# Patient Record
Sex: FEMALE | Race: WHITE
[De-identification: names, ages, dates, MRNs, and addresses within clinical notes are randomized per-mention and may not be internally consistent; named-entity substitution may affect disease eponyms.]

---

## 2018-12-09 ENCOUNTER — HOSPITAL ENCOUNTER (EMERGENCY)
Dept: HOSPITAL 76 - ED | Age: 28
Discharge: HOME | End: 2018-12-09
Payer: MEDICAID

## 2018-12-09 VITALS — SYSTOLIC BLOOD PRESSURE: 101 MMHG | DIASTOLIC BLOOD PRESSURE: 59 MMHG

## 2018-12-09 DIAGNOSIS — J98.01: ICD-10-CM

## 2018-12-09 DIAGNOSIS — F17.200: ICD-10-CM

## 2018-12-09 DIAGNOSIS — J40: Primary | ICD-10-CM

## 2018-12-09 PROCEDURE — 99283 EMERGENCY DEPT VISIT LOW MDM: CPT

## 2018-12-09 PROCEDURE — 93005 ELECTROCARDIOGRAM TRACING: CPT

## 2018-12-09 PROCEDURE — 71046 X-RAY EXAM CHEST 2 VIEWS: CPT

## 2018-12-09 PROCEDURE — 94640 AIRWAY INHALATION TREATMENT: CPT

## 2018-12-09 NOTE — ED PHYSICIAN DOCUMENTATION
PD HPI DYSPNEA





- Stated complaint


Stated Complaint: SHORTNESS OF BREATH





- Chief complaint


Chief Complaint: Resp





- History obtained from


History obtained from: Patient





- History of Present Illness


Timing - onset: How many weeks ago (couple of weeks, per patient)


Timing - duration: Weeks


Timing - details: Gradual onset


Pain level now: 5 (right ribs)


Improved by: Rest


Worsened by: Coughing


Associated symptoms: Cough, Chest pain / discomfort.  No: Fever, Bilateral 

edema, Unilateral edema


Recently seen: Emergency Dept ( ED)





- Additional information


Additional information: 





c/o cough, dyspnea x few weeks with right-sided chest pain. evaluated at  ED 

few days ago for same





Review of Systems


Constitutional: denies: Fever, Chills, Sweats


Cardiac: reports: Chest pain / pressure.  denies: Palpitations, Pedal edema


Respiratory: reports: Dyspnea, Cough, Wheezing.  denies: Hemoptysis





PD PAST MEDICAL HISTORY





- Past Medical History


Past Medical History: Yes


Cardiovascular: None


Respiratory: None


Endocrine/Autoimmune: None


GI: None


GYN: Ovarian cysts, Ovarian cancer


: None


HEENT: Chronic hearing loss


Psych: Depression, Anxiety


Musculoskeletal: Other


Derm: None





- Past Surgical History


Past Surgical History: Yes


General: Appendectomy


/GYN:  section, Oophrectomy





- Present Medications


Home Medications: 


                                Ambulatory Orders











 Medication  Instructions  Recorded  Confirmed


 


Citalopram Hydrobromide  18





[Citalopram HBr]   


 


Cyclobenzaprine [Flexeril] 10 mg PO TID PRN #20 tablet 18 


 


Loperamide [Imodium] 2 mg PO QID PRN #10 capsule 08/10/18 


 


Ondansetron HCl [Zofran] 4 mg PO Q6H PRN #10 tablet 08/10/18 


 


Promethazine [Phenergan] 25 - 50 mg PO Q6H PRN #15 tab 08/10/18 


 


Hydrocodone/Acetaminophen 1 - 2 each PO Q6H PRN #10 tablet 18 





[Hydrocodon-Acetaminophen 5-325]   


 


predniSONE [Prednisone] 40 mg PO DAILY 4 Days #8 tablet 18 














- Allergies


Allergies/Adverse Reactions: 


                                    Allergies











Allergy/AdvReac Type Severity Reaction Status Date / Time


 


adhesive Allergy  Rash Verified 18 01:14


 


Penicillins Allergy  Hives Verified 18 01:14


 


vancomycin Allergy  Rash Verified 18 01:14


 


latex AdvReac  Rash Verified 18 01:14














- Social History


Does the pt smoke?: Yes


Smoking Status: Current every day smoker


Does the pt drink ETOH?: Yes


Does the pt have substance abuse?: No





- Immunizations


Immunizations are current?: Yes





- POLST


Patient has POLST: No





PD ED PE NORMAL





- Vitals


Vital signs reviewed: Yes





- General


General: Alert and oriented X 3, No acute distress, Well developed/nourished, 

Other (asleep, wakes with repeated verbal stimulus)





- Neck


Neck: Supple, no meningeal sign





- Cardiac


Cardiac: RRR, No murmur





- Respiratory


Respiratory: No respiratory distress





PD ED PE EXPANDED





- Respiratory


Respiratory: Wheezing





Results





- Vitals


Vitals: 


                               Vital Signs - 24 hr











  18





  01:05 01:47 02:23


 


Temperature 36.8 C  


 


Heart Rate 84 71 70


 


Respiratory 16 19 18





Rate   


 


Blood Pressure 121/83 H 101/54 L 


 


O2 Saturation 100 95 














  18





  02:35 04:19


 


Temperature  


 


Heart Rate 72 71


 


Respiratory 22 17





Rate  


 


Blood Pressure 108/66 101/59 L


 


O2 Saturation 97 95








                                     Oxygen











O2 Source                      Room air

















- Rads (name of study)


  ** chest xray


Radiology: Prelim report reviewed, See rad report





PD MEDICAL DECISION MAKING





- ED course


Complexity details: reviewed results, re-evaluated patient, considered 

differential, d/w patient


ED course: 





on reevaluation, patient is in NAD. improved aeration with trace residual end-

expiratory wheezing bilaterally. she is again drowsy on reevaluation. as on 

initial HPI, s.o. says patient has had right chest pain uncontrolled with OTC 

medication and is out of the hydrocodone prescribed from [facility] ED visit. she is not

 in any apparent distress in ED tonight. will rx prednisone and limited amount 

of hydrocodone for chest wall pain. I instructed her to f/u with her PMD next 

available appointment





Departure





- Departure


Disposition:  Home, Self Care


Clinical Impression: 


 Bronchitis with bronchospasm





Condition: Good


Instructions:  ED Bronchitis Asthmatic


Follow-Up: 


Jeffrey Mercer MD [Primary Care Provider] -  (Call tomorrow to arrange for next 

available appointment)


Prescriptions: 


Hydrocodone/Acetaminophen [Hydrocodon-Acetaminophen 5-325] 1 - 2 each PO Q6H PRN

#10 tablet


 PRN Reason: pain


predniSONE [Prednisone] 40 mg PO DAILY 4 Days #8 tablet


Discharge Date/Time: 18 04:35

## 2019-02-16 ENCOUNTER — HOSPITAL ENCOUNTER (EMERGENCY)
Dept: HOSPITAL 76 - ED | Age: 29
Discharge: HOME | End: 2019-02-16
Payer: MEDICAID

## 2019-02-16 ENCOUNTER — HOSPITAL ENCOUNTER (OUTPATIENT)
Dept: HOSPITAL 76 - EMS | Age: 29
Discharge: ANOTHER HOSPITAL | End: 2019-02-16
Attending: SURGERY
Payer: MEDICAID

## 2019-02-16 VITALS — DIASTOLIC BLOOD PRESSURE: 72 MMHG | SYSTOLIC BLOOD PRESSURE: 114 MMHG

## 2019-02-16 DIAGNOSIS — R68.83: ICD-10-CM

## 2019-02-16 DIAGNOSIS — E86.0: Primary | ICD-10-CM

## 2019-02-16 DIAGNOSIS — F17.200: ICD-10-CM

## 2019-02-16 DIAGNOSIS — R19.7: ICD-10-CM

## 2019-02-16 DIAGNOSIS — N93.8: ICD-10-CM

## 2019-02-16 DIAGNOSIS — R11.0: Primary | ICD-10-CM

## 2019-02-16 DIAGNOSIS — R61: ICD-10-CM

## 2019-02-16 DIAGNOSIS — R11.2: ICD-10-CM

## 2019-02-16 LAB
ALBUMIN DIAFP-MCNC: 4.2 G/DL (ref 3.2–5.5)
ALBUMIN/GLOB SERPL: 1.3 {RATIO} (ref 1–2.2)
ALP SERPL-CCNC: 63 IU/L (ref 42–121)
ALT SERPL W P-5'-P-CCNC: 15 IU/L (ref 10–60)
ANION GAP SERPL CALCULATED.4IONS-SCNC: 10 MMOL/L (ref 6–13)
AST SERPL W P-5'-P-CCNC: 20 IU/L (ref 10–42)
BASOPHILS NFR BLD AUTO: 0 10^3/UL (ref 0–0.1)
BASOPHILS NFR BLD AUTO: 0.2 %
BILIRUB BLD-MCNC: 0.8 MG/DL (ref 0.2–1)
BUN SERPL-MCNC: 11 MG/DL (ref 6–20)
CALCIUM UR-MCNC: 8.7 MG/DL (ref 8.5–10.3)
CHLORIDE SERPL-SCNC: 104 MMOL/L (ref 101–111)
CO2 SERPL-SCNC: 29 MMOL/L (ref 21–32)
CREAT SERPLBLD-SCNC: 0.6 MG/DL (ref 0.4–1)
EOSINOPHIL # BLD AUTO: 0 10^3/UL (ref 0–0.7)
EOSINOPHIL NFR BLD AUTO: 0 %
ERYTHROCYTE [DISTWIDTH] IN BLOOD BY AUTOMATED COUNT: 13.4 % (ref 12–15)
GFRSERPLBLD MDRD-ARVRAT: 117 ML/MIN/{1.73_M2} (ref 89–?)
GLOBULIN SER-MCNC: 3.2 G/DL (ref 2.1–4.2)
GLUCOSE SERPL-MCNC: 114 MG/DL (ref 70–100)
HGB UR QL STRIP: 13.5 G/DL (ref 12–16)
LIPASE SERPL-CCNC: 22 U/L (ref 22–51)
LYMPHOCYTES # SPEC AUTO: 0.8 10^3/UL (ref 1.5–3.5)
LYMPHOCYTES NFR BLD AUTO: 4.4 %
MAGNESIUM SERPL-MCNC: 1.7 MG/DL (ref 1.7–2.8)
MCH RBC QN AUTO: 32.2 PG (ref 27–31)
MCHC RBC AUTO-ENTMCNC: 33.5 G/DL (ref 32–36)
MCV RBC AUTO: 96.2 FL (ref 81–99)
MONOCYTES # BLD AUTO: 0.8 10^3/UL (ref 0–1)
MONOCYTES NFR BLD AUTO: 4.7 %
NEUTROPHILS # BLD AUTO: 16 10^3/UL (ref 1.5–6.6)
NEUTROPHILS # SNV AUTO: 17.6 X10^3/UL (ref 4.8–10.8)
NEUTROPHILS NFR BLD AUTO: 90.7 %
PDW BLD AUTO: 7.8 FL (ref 7.9–10.8)
PLATELET # BLD: 286 10^3/UL (ref 130–450)
PROT SPEC-MCNC: 7.4 G/DL (ref 6.7–8.2)
RBC MAR: 4.2 10^6/UL (ref 4.2–5.4)
SODIUM SERPLBLD-SCNC: 143 MMOL/L (ref 135–145)

## 2019-02-16 PROCEDURE — 87275 INFLUENZA B AG IF: CPT

## 2019-02-16 PROCEDURE — 96361 HYDRATE IV INFUSION ADD-ON: CPT

## 2019-02-16 PROCEDURE — 83735 ASSAY OF MAGNESIUM: CPT

## 2019-02-16 PROCEDURE — 85025 COMPLETE CBC W/AUTO DIFF WBC: CPT

## 2019-02-16 PROCEDURE — 87276 INFLUENZA A AG IF: CPT

## 2019-02-16 PROCEDURE — 80053 COMPREHEN METABOLIC PANEL: CPT

## 2019-02-16 PROCEDURE — 99284 EMERGENCY DEPT VISIT MOD MDM: CPT

## 2019-02-16 PROCEDURE — 36415 COLL VENOUS BLD VENIPUNCTURE: CPT

## 2019-02-16 PROCEDURE — 96374 THER/PROPH/DIAG INJ IV PUSH: CPT

## 2019-02-16 PROCEDURE — 83690 ASSAY OF LIPASE: CPT

## 2019-02-16 NOTE — ED PHYSICIAN DOCUMENTATION
PD HPI NVD





- Stated complaint


Stated Complaint: N/V/D





- Chief complaint


Chief Complaint: Abd Pain





- History obtained from


History obtained from: Patient, Family (spouse)





- History of Present Illness


Timing - onset: How many days ago (2)


Timing - duration: Days (2)


Timing - details: Abrupt onset (she had feeling of malaise and weakness, some 

chills for past 3 days and then with repetitive vomiting and diarrhea since 

yesterday.), Still present


Associated symptoms: Abdominal pain (cramping), Near syncope / syncope (very 

lightheaded), Loss of appetite.  No: Fever


Contributing factors: No: Sick contact, Bad food


Improved by: No: Vomiting


Worsened by: Eating


Similar symptoms before: Has not had sx before


Recently seen: Surgery (ovary surgery 3 months ago and has had some mild vaginal

bleeding the past several days. No discharge.)





Review of Systems


Constitutional: reports: Chills, Myalgias, Fatigue.  denies: Fever


Nose: denies: Rhinorrhea / runny nose, Congestion


Throat: denies: Sore throat


Respiratory: reports: Cough


GI: reports: Nausea, Vomiting, Diarrhea.  denies: Abdominal Pain, Hematemesis, 

Bloody / black stool


: denies: Dysuria, Frequency


Neurologic: reports: Generalized weakness, Near syncope, Headache.  denies: 

Altered mental status





PD PAST MEDICAL HISTORY





- Past Medical History


Past Medical History: Yes


Cardiovascular: None


Respiratory: None


Endocrine/Autoimmune: None


GI: None


GYN: Ovarian cysts, Ovarian cancer


: None


HEENT: Chronic hearing loss


Psych: Depression, Anxiety


Musculoskeletal: Other


Derm: None





- Past Surgical History


Past Surgical History: Yes


General: Appendectomy


/GYN:  section, Oophrectomy





- Present Medications


Home Medications: 


                                Ambulatory Orders











 Medication  Instructions  Recorded  Confirmed


 


Ondansetron HCl [Zofran] 4 mg PO Q6H PRN #10 tablet 08/10/18 


 


Promethazine [Phenergan] 25 - 50 mg PO Q6H PRN #15 tab 08/10/18 


 


Diphenoxylate/Atropine [Lomotil] 1 each PO QID PRN #12 tablet 19 


 


Hydrocodone/Acetaminophen [Norco 1 each PO Q6H PRN #10 tablet 19 





5-325 Tablet]   


 


Ondansetron Odt [Zofran] 4 mg TL Q6H PRN #10 tablet 19 


 


Promethazine Supp [Phenergan Supp] 25 mg [place] Q6H PRN #5 supp 19 














- Allergies


Allergies/Adverse Reactions: 


                                    Allergies











Allergy/AdvReac Type Severity Reaction Status Date / Time


 


adhesive Allergy  Rash Verified 18 01:14


 


Penicillins Allergy  Hives Verified 18 01:14


 


vancomycin Allergy  Rash Verified 18 01:14


 


latex AdvReac  Rash Verified 18 01:14














- Social History


Does the pt smoke?: Yes


Smoking Status: Current every day smoker


Does the pt drink ETOH?: Yes


Does the pt have substance abuse?: No





- Immunizations


Immunizations are current?: Yes





- POLST


Patient has POLST: No





PD ED PE NORMAL





- Vitals


Vital signs reviewed: Yes





- General


General: Alert and oriented X 3, Well developed/nourished, Other (holding emesis

bag. IV already in place by EMS. )





- HEENT


HEENT: Ears normal, Pharynx benign.  No: Moist mucous membranes





- Neck


Neck: Supple, no meningeal sign, No adenopathy





- Cardiac


Cardiac: RRR, No murmur





- Respiratory


Respiratory: Clear bilaterally





- Abdomen


Abdomen: Soft, Non tender





- Female 


Female : Deferred





- Rectal


Rectal: Deferred





- Back


Back: No CVA TTP





- Derm


Derm: Warm and dry.  No: Normal color (pale)





- Extremities


Extremities: Normal ROM s pain





- Neuro


Neuro: Alert and oriented X 3, No motor deficit, Normal speech





Results





- Vitals


Vitals: 


                                     Oxygen











O2 Source                      Room air

















- Labs


Labs: 


                                Laboratory Tests











  19





  13:23 13:23 15:30


 


WBC  17.6 H  


 


RBC  4.20  


 


Hgb  13.5  


 


Hct  40.4  


 


MCV  96.2  


 


MCH  32.2 H  


 


MCHC  33.5  


 


RDW  13.4  


 


Plt Count  286  


 


MPV  7.8 L  


 


Neut # (Auto)  16.0 H  


 


Lymph # (Auto)  0.8 L  


 


Mono # (Auto)  0.8  


 


Eos # (Auto)  0.0  


 


Baso # (Auto)  0.0  


 


Absolute Nucleated RBC  0.00  


 


Nucleated RBC %  0.0  


 


Sodium   143 


 


Potassium   3.5 


 


Chloride   104 


 


Carbon Dioxide   29 


 


Anion Gap   10.0 


 


BUN   11 


 


Creatinine   0.6 


 


Estimated GFR (MDRD)   117 


 


Glucose   114 H 


 


Calcium   8.7 


 


Magnesium   1.7 


 


Total Bilirubin   0.8 


 


AST   20 


 


ALT   15 


 


Alkaline Phosphatase   63 


 


Total Protein   7.4 


 


Albumin   4.2 


 


Globulin   3.2 


 


Albumin/Globulin Ratio   1.3 


 


Lipase   22 


 


Influenza A (Rapid)    Negative


 


Influenza B (Rapid)    Negative














PD MEDICAL DECISION MAKING





- ED course


Complexity details: re-evaluated patient (improved with meds and fluids. Feeling

sleepy from meds. ), considered differential (sounds likely viral GE vs food 

related. With some general symptoms, consider influenza. ), d/w patient





Departure





- Departure


Disposition: 01 Home, Self Care


Clinical Impression: 


 Nausea vomiting and diarrhea, Dehydration





Condition: Stable


Record reviewed to determine appropriate education?: Yes


Instructions:  ED Nausea Vomiting


Follow-Up: 


Jeffrey Mercer MD [Primary Care Provider] - 


Prescriptions: 


Diphenoxylate/Atropine [Lomotil] 1 each PO QID PRN #12 tablet


 PRN Reason: Diarrhea


Hydrocodone/Acetaminophen [Norco 5-325 Tablet] 1 each PO Q6H PRN #10 tablet


 PRN Reason: Pain


Ondansetron Odt [Zofran] 4 mg TL Q6H PRN #10 tablet


 PRN Reason: Nausea / Vomiting


Promethazine Supp [Phenergan Supp] 25 mg MN Q6H PRN #5 supp


 PRN Reason: Nausea / Vomiting


Comments: 


This sounds likely to be a viral illness and typically will last a few days and 

then improve.  Hopefully will have gotten you over the worst of the symptoms.  

Small frequent fluids and food as tolerated.  Ondansetron if needed for nausea. 

Lomotil if needed for diarrhea.  If you have nausea or vomiting despite the oral

medicine, you can use promethazine suppository.  Add hydrocodone if needed for 

pains or cramps.  Recheck if not mostly improved over the next day or 2 or if 

the symptoms are not improved enough with the above medicines.


Discharge Date/Time: 19 17:11

## 2019-09-21 ENCOUNTER — HOSPITAL ENCOUNTER (EMERGENCY)
Dept: HOSPITAL 76 - ED | Age: 29
Discharge: HOME | End: 2019-09-21
Payer: SELF-PAY

## 2019-09-21 VITALS — DIASTOLIC BLOOD PRESSURE: 77 MMHG | SYSTOLIC BLOOD PRESSURE: 99 MMHG
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
## 2019-09-21 DIAGNOSIS — S46.811A: Primary | ICD-10-CM

## 2019-09-21 DIAGNOSIS — F17.210: ICD-10-CM

## 2019-09-21 DIAGNOSIS — S00.81XA: ICD-10-CM
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
## 2019-09-21 DIAGNOSIS — S50.811A: ICD-10-CM

## 2019-09-21 PROCEDURE — 99282 EMERGENCY DEPT VISIT SF MDM: CPT

## 2019-09-21 PROCEDURE — 99284 EMERGENCY DEPT VISIT MOD MDM: CPT

## 2019-09-21 NOTE — ED PHYSICIAN DOCUMENTATION
PD HPI UPPER EXT INJURY





- Stated complaint


Stated Complaint: R SHOULDER INJ





- Chief complaint


Chief Complaint: Ext Problem





- History obtained from


History obtained from: Patient





- History of Present Illness


Location: Right


Type of injury: Other (No specific injury)


Timing - onset: How many weeks ago (1)


Timing - details: Constant


Pain level now: 8


Improved by: Nothing (She can get comfortable in certain positions)


Worsened by: Moving, Palpating


Associated symptoms: Other (Increased cold sensation in the right arm).  No: 

Numbness, Tingling, Swelling


Similar symptoms before: Has not had sx before


Recently seen: Emergency Dept (Went to [place] emergency department a few days

ago for abdominal discomfort)





- Additonal information


Additional information: 





This is a 29-year-old woman who presents with complaints that for the past week 

her right shoulder has been stiff and she is feeling spasms that are even 

progressing up into her neck and back.  She feels like this shoulder is 

"slipping out of socket".  There is also a feeling of a "McClain" being present 

beneath the right shoulder blade between the chest wall.  Patient is right-

handed and works in a job that requires a lot of lifting however she does not 

remember any specific injury.  Pain currently is an 8-9 out of 10.  She is been 

taking ibuprofen 800 mg every 5-6 hours over-the-counter as well as Tylenol and 

using hot soaks.  Denies numbness or tingling but says the arm gets cold faster 

than normal.  She is never had injury to the shoulder before.  There is some p

ain with deep breathing but denies cough more than her usual smoker's cough.  No

fever.  She currently reports that she does not have a primary care provider and

is erratically taking her medication due to some insurance lapses in coverage.





Review of Systems


Constitutional: denies: Fever


Respiratory: reports: Cough (Chronic smoker's cough).  denies: Dyspnea


Skin: reports: Other (She has abrasions on her arm and face due to playing with 

a puppy).  denies: Rash


Musculoskeletal: reports: Extremity pain


Neurologic: denies: Generalized weakness, Numbness





PD PAST MEDICAL HISTORY





- Past Medical History


Cardiovascular: None


Respiratory: None


Endocrine/Autoimmune: None


GI: None


GYN: Ovarian cysts, Ovarian cancer


: None


HEENT: Chronic hearing loss


Psych: Depression, Anxiety


Musculoskeletal: Other


Derm: None





- Past Surgical History


Past Surgical History: Yes


General: Appendectomy


/GYN:  section, Oophrectomy





- Present Medications


Home Medications: 


                                Ambulatory Orders











 Medication  Instructions  Recorded  Confirmed


 


Ondansetron HCl [Zofran] 4 mg PO Q6H PRN #10 tablet 08/10/18 


 


Promethazine [Phenergan] 25 - 50 mg PO Q6H PRN #15 tab 08/10/18 


 


Diphenoxylate/Atropine [Lomotil] 1 each PO QID PRN #12 tablet 19 


 


Hydrocodone/Acetaminophen [Norco 1 each PO Q6H PRN #10 tablet 19 





5-325 Tablet]   


 


Ondansetron Odt [Zofran] 4 mg TL Q6H PRN #10 tablet 19 


 


Promethazine Supp [Phenergan Supp] 25 mg [place] Q6H PRN #5 supp 19 


 


Cyclobenzaprine [Flexeril] 10 mg PO TID PRN #20 tablet 19 














- Allergies


Allergies/Adverse Reactions: 


                                    Allergies











Allergy/AdvReac Type Severity Reaction Status Date / Time


 


adhesive Allergy  Rash Verified 18 01:14


 


Penicillins Allergy  Hives Verified 18 01:14


 


vancomycin Allergy  Rash Verified 18 01:14


 


latex AdvReac  Rash Verified 18 01:14














- Social History


Does the pt smoke?: Yes


Smoking Status: Current every day smoker


Does the pt drink ETOH?: Yes


Does the pt have substance abuse?: No





- Immunizations


Immunizations are current?: Yes





- POLST


Patient has POLST: No





PD ED PE NORMAL





- Vitals


Vital signs reviewed: Yes





- General


General: Alert and oriented X 3 (Thin 29-year-old woman.  She smells strongly of

 cigarettes.)





- Neck


Neck: No JVD





- Cardiac


Cardiac: RRR, No murmur





- Respiratory


Respiratory: No respiratory distress, Clear bilaterally





- Back


Back: Other (Tenderness with palpation along the right scapular medial border.)





- Derm


Derm: Normal color, Other (There are several linear abrasions on the right 

dorsal forearm that do not appear to be infected.  There is also linear 

abrasions on sides of her forehead.)





- Extremities


Extremities: No deformity, Other (Range of motion is limited in the right 

shoulder to approximately 100 degrees of active abduction due to discomfort.)





- Neuro


Neuro: Alert and oriented X 3, CNs 2-12 intact, No motor deficit, No sensory 

deficit, Normal speech, Other (Sensation is intact to light touch throughout the

 right arm but she complains of diminished sensation over the right deltoid.)





Results





- Vitals


Vitals: 


                               Vital Signs - 24 hr











  19





  12:20


 


Temperature 36.1 C L


 


Heart Rate 88


 


Respiratory 18





Rate 


 


Blood Pressure 99/77


 


O2 Saturation 99








                                     Oxygen











O2 Source                      Room air

















PD MEDICAL DECISION MAKING





- ED course


Complexity details: reviewed old records, d/w patient (29-year-old woman with 

pain under the right shoulder blade and tenderness along the scapular border.  I

 think this is more consistent with a strain and spasm in the rhomboid muscle 

than an actual rotator cuff injury.  She is Pieter taking ibuprofen over-the-cou

nter without relief of the pain.  I recommended icing this and we discussed and 

demonstrated some stretching exercises.  Do not feel that x-rays are warranted 

at this time.  Will prescribe a few doses of a muscle relaxer see if that helps 

alleviate the pain better than the ibuprofen.  She is also encouraged to follow 

back up with a primary care provider and she intends to go to the [facility] and see a provider there.)





Departure





- Departure


Disposition: 01 Home, Self Care


Clinical Impression: 


 Rhomboid muscle strain





Condition: Good


Instructions:  ED Spasm Muscle, ED Strain Muscle Ext


Follow-Up: 


Jessica Contreras ARNP [Credentialed Staff Provider] - 


Prescriptions: 


Cyclobenzaprine [Flexeril] 10 mg PO TID PRN #20 tablet


 PRN Reason: Spasms


Comments: 


Continue to take ibuprofen but no more than 800 mg every 8 hours with food.  

Gentle stretching exercises as we discussed.  Ice may be helpful as well as 

massage.  Flexeril can be used for muscle relaxer but do not drive or operate 

machinery if taking that medication.  Follow-up with your primary care provider 

for further work-up and management if the pain persists.


Forms:  Activity restrictions

## 2019-10-02 ENCOUNTER — HOSPITAL ENCOUNTER (EMERGENCY)
Dept: HOSPITAL 76 - ED | Age: 29
Discharge: HOME | End: 2019-10-02
Payer: SELF-PAY

## 2019-10-02 VITALS — SYSTOLIC BLOOD PRESSURE: 107 MMHG | DIASTOLIC BLOOD PRESSURE: 67 MMHG

## 2019-10-02 DIAGNOSIS — F17.200: ICD-10-CM

## 2019-10-02 DIAGNOSIS — M75.51: Primary | ICD-10-CM

## 2019-10-02 PROCEDURE — 99282 EMERGENCY DEPT VISIT SF MDM: CPT

## 2019-10-02 PROCEDURE — 99283 EMERGENCY DEPT VISIT LOW MDM: CPT

## 2019-10-02 NOTE — ED PHYSICIAN DOCUMENTATION
PD HPI UPPER EXT INJURY





- Stated complaint


Stated Complaint: RIGHT SHOULDER PX





- Chief complaint


Chief Complaint: Ext Problem





- History obtained from


History obtained from: Patient, Family





- History of Present Illness


Location: Right, Shoulder


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


Where injury occurred: Work


Timing - onset: How many weeks ago (3)


Timing - duration: Weeks (3)


Timing - details: Gradual onset, Still present


Improved by: Rest, Immobilization


Worsened by: Moving, Palpating


Associated symptoms: No: Weakness, Numbness, Tingling, Swelling


Contributing factors: No: Anticoagulated


Similar symptoms before: Diagnosis (rhomboid muscle pain)


Recently seen: Emergency Dept





- Additonal information


Additional information: 





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 

her right shoulder over the past 3 weeks.  She is having pain with any movement 

or when she is at work.  She was seen in the emergency department 10 days ago 

with pain in the rhomboid similar to what she has today and the same problem.  

She was treated with Flexeril which made it easy for her to sleep but has not 

resolved her problem.





Review of Systems


Constitutional: denies: Fever


Eyes: denies: Decreased vision


Ears: denies: Ear pain


Nose: denies: Congestion


Throat: denies: Sore throat


Cardiac: denies: Chest pain / pressure, Palpitations


Respiratory: denies: Dyspnea, Cough


GI: denies: Abdominal Pain, Nausea, Vomiting


: denies: Dysuria, Frequency





PD PAST MEDICAL HISTORY





- Past Medical History


Cardiovascular: None


Respiratory: None


Endocrine/Autoimmune: None


GI: None


GYN: Ovarian cysts, Ovarian cancer


: None


HEENT: Chronic hearing loss


Psych: Depression, Anxiety


Musculoskeletal: Other


Derm: None





- Past Surgical History


Past Surgical History: Yes


General: Appendectomy


/GYN:  section, Oophrectomy





- Present Medications


Home Medications: 


                                Ambulatory Orders











 Medication  Instructions  Recorded  Confirmed


 


Ondansetron HCl [Zofran] 4 mg PO Q6H PRN #10 tablet 08/10/18 


 


Promethazine [Phenergan] 25 - 50 mg PO Q6H PRN #15 tab 08/10/18 


 


Diphenoxylate/Atropine [Lomotil] 1 each PO QID PRN #12 tablet 19 


 


Hydrocodone/Acetaminophen [Norco 1 each PO Q6H PRN #10 tablet 19 





5-325 Tablet]   


 


Ondansetron Odt [Zofran] 4 mg TL Q6H PRN #10 tablet 19 


 


Promethazine Supp [Phenergan Supp] 25 mg [place] Q6H PRN #5 supp 19 


 


Cyclobenzaprine [Flexeril] 10 mg PO TID PRN #20 tablet 19 














- Allergies


Allergies/Adverse Reactions: 


                                    Allergies











Allergy/AdvReac Type Severity Reaction Status Date / Time


 


adhesive Allergy  Rash Verified 10/02/19 09:38


 


Penicillins Allergy  Hives Verified 10/02/19 09:38


 


vancomycin Allergy  Rash Verified 10/02/19 09:38


 


ketorolac [From Toradol] AdvReac  Headache Verified 10/02/19 11:14


 


latex AdvReac  Rash Verified 10/02/19 09:38














- Social History


Does the pt smoke?: Yes


Smoking Status: Current every day smoker


Does the pt drink ETOH?: Yes


Does the pt have substance abuse?: No





- Immunizations


Immunizations are current?: Yes





- POLST


Patient has POLST: No





PD ED PE NORMAL





- Vitals


Vital signs reviewed: Yes (tachy )





- General


General: Alert and oriented X 3, No acute distress, Well developed/nourished





- HEENT


HEENT: Atraumatic, PERRL, EOMI





- Neck


Neck: Supple, no meningeal sign





- Cardiac


Cardiac: RRR, No murmur





- Respiratory


Respiratory: No respiratory distress, Clear bilaterally





- Derm


Derm: Normal color, Warm and dry, No rash





- Extremities


Extremities: No deformity, No edema, Other (There is mild pain to palpation of 

the anterior deltoid and more pain over the supraspinatous. There is pain 

localized to the scapula without specific point tenderness appreciated. She is 

able to move the shoulder through a range of motion with pain restricting 

movement beyond 100 degrees.  The distal n/v is intact. )





- Neuro


Neuro: Alert and oriented X 3, CNs 2-12 intact, No motor deficit, No sensory 

deficit, Normal speech


Eye Opening: Spontaneous


Motor: Obeys Commands


Verbal: Oriented


GCS Score: 15





- Psych


Psych: Normal mood, Normal affect





Results





- Vitals


Vitals: 


                               Vital Signs - 24 hr











  10/02/19





  09:38


 


Temperature 36.6 C


 


Heart Rate 108 H


 


Respiratory 15





Rate 


 


Blood Pressure 105/66


 


O2 Saturation 99








                                     Oxygen











O2 Source                      Room air

















- Rads (name of study)


  ** right shoulder


Radiology: Prelim report reviewed (Impression: No acute process detected.), EMP 

read indepedently, See rad report





PD MEDICAL DECISION MAKING





- ED course


Complexity details: reviewed results, re-evaluated patient, considered 

differential, d/w patient


ED course: 


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 

bursitis of the right shoulder.  I have asked to reduce her level of work at the

store and we will give her a note for limited use of the right arm.  She is 

given a dose of dexamethasone here in the emergency department and she is 

encouraged to use ibuprofen with food and supplement with Tylenol for pain 

control.  She is given a sling for comfort.








Departure





- Departure


Disposition: 01 Home, Self Care


Clinical Impression: 


Bursitis


Qualifiers:


 Bursitis location: shoulder Laterality: right Qualified Code(s): M75.51 - 

Bursitis of right shoulder





Condition: Stable


Instructions:  ED Bursitis


Follow-Up: 


[facility] [Provider Group]


Comments: 


We have given you a note for work for 1 week to have limited use of the right 

arm you may require a much longer period of time of limited use and if you do 

not have satisfactory improvement follow-up with the orthopedic doctors.


Forms:  Activity restrictions

## 2019-10-02 NOTE — XRAY REPORT
Reason:  shoulder pain

Procedure Date:  10/02/2019   

Accession Number:  [id number] / [id number]                    

Procedure:  XR  - Shoulder 3 View RT CPT Code:  

 

FULL RESULT:

 

 

EXAM:

RIGHT SHOULDER RADIOGRAPHY

 

EXAM DATE: 10/2/2019 10:56 AM.

 

CLINICAL HISTORY: Shoulder pain.

 

COMPARISON: SHOULDER 3 VIEW RT 07/29/2018 1:29 PM.

 

TECHNIQUE: 3 views.

 

FINDINGS:

Bones: Normal. No fracture or bone lesion.

 

Joints: The glenohumeral and acromioclavicular joints are normal.

 

Soft tissues: The visualized hemithorax is unremarkable. No soft tissue 

swelling.

IMPRESSION: No acute processes detected.

 

[facility]

## 2020-01-11 ENCOUNTER — HOSPITAL ENCOUNTER (EMERGENCY)
Dept: HOSPITAL 76 - ED | Age: 30
Discharge: HOME | End: 2020-01-11
Payer: SELF-PAY

## 2020-01-11 VITALS — DIASTOLIC BLOOD PRESSURE: 70 MMHG | SYSTOLIC BLOOD PRESSURE: 112 MMHG

## 2020-01-11 DIAGNOSIS — M94.0: Primary | ICD-10-CM

## 2020-01-11 DIAGNOSIS — F17.200: ICD-10-CM

## 2020-01-11 PROCEDURE — 99283 EMERGENCY DEPT VISIT LOW MDM: CPT

## 2020-01-11 PROCEDURE — 99284 EMERGENCY DEPT VISIT MOD MDM: CPT

## 2020-01-11 PROCEDURE — 71101 X-RAY EXAM UNILAT RIBS/CHEST: CPT

## 2020-01-11 NOTE — ED PHYSICIAN DOCUMENTATION
History of Present Illness





- Stated complaint


Stated Complaint: SOA/RIB BULGING





- Chief complaint


Chief Complaint: General





- History obtained from


History obtained from: Patient





- History of Present Illness


Timing: Today, How many weeks ago (1)


Pain level max: 7


Pain level now: 5





- Additonal information


Additional information: 





29-year-old female states that her left lower rib has been hurting for the past 

week or so.  Worse with movement and better with rest.  No trauma.  No cough 

other than her normal chronic "smoker's cough". Patient states that she took 

Motrin and Tylenol without relief.





Review of Systems


Constitutional: denies: Fever, Chills


Throat: denies: Sore throat


Cardiac: denies: Chest pain / pressure


Respiratory: denies: Dyspnea, Cough, Wheezing


GI: denies: Nausea, Vomiting, Diarrhea


: denies: Dysuria


Skin: denies: Rash





PD PAST MEDICAL HISTORY





- Past Medical History


Past Medical History: Yes


Cardiovascular: None


Respiratory: None


Neuro: None


Endocrine/Autoimmune: None


GI: None


GYN: Ovarian cysts, Ovarian cancer


: None


HEENT: Chronic hearing loss


Psych: Depression, Anxiety


Musculoskeletal: Other


Derm: None





- Past Surgical History


Past Surgical History: Yes


General: Appendectomy, Other


/GYN:  section, Oophrectomy





- Present Medications


Home Medications: 


                                Ambulatory Orders











 Medication  Instructions  Recorded  Confirmed


 


Hydrocodone/Acetaminophen 1 - 2 each PO Q6H PRN #14 tablet 20 





[Hydrocodon-Acetaminophen 5-325]   


 


Lidocaine Patch 5% [Lidoderm Patch] 1 patch TOP DAILY PRN #10 patch 20 














- Allergies


Allergies/Adverse Reactions: 


                                    Allergies











Allergy/AdvReac Type Severity Reaction Status Date / Time


 


adhesive Allergy  Rash Verified 20 11:34


 


Penicillins Allergy  Hives Verified 20 11:34


 


vancomycin Allergy  Rash Verified 20 11:34


 


ketorolac [From Toradol] AdvReac  Headache Verified 20 11:34


 


latex AdvReac  Rash Verified 20 11:34














- Social History


Does the pt smoke?: Yes


Smoking Status: Current every day smoker


Does the pt drink ETOH?: Yes


Does the pt have substance abuse?: No





- Immunizations


Immunizations are current?: Yes





- POLST


Patient has POLST: No





PD ED PE NORMAL





- Vitals


Vital signs reviewed: Yes





- General


General: Alert and oriented X 3, No acute distress





- HEENT


HEENT: Moist mucous membranes





- Neck


Neck: Supple, no meningeal sign





- Cardiac


Cardiac: RRR





- Respiratory


Respiratory: No respiratory distress, Clear bilaterally, Other (Tender to 

palpation left lower ribs.  No crepitus.  No ecchymosis.)





- Abdomen


Abdomen: Soft, Non tender, Non distended





- Derm


Derm: Warm and dry, No rash





- Neuro


Neuro: Alert and oriented X 3





- Psych


Psych: Normal mood, Normal affect





Results





- Vitals


Vitals: 


                               Vital Signs - 24 hr











  20





  11:31 13:57


 


Temperature 36.5 C 


 


Heart Rate 95 87


 


Respiratory 18 16





Rate  


 


Blood Pressure 115/73 112/70


 


O2 Saturation 99 100








                                     Oxygen











O2 Source                      Room air

















- Rads (name of study)


  ** cxr


Radiology: Prelim report reviewed, EMP read contemporaneously, See rad report 

(Normal)





PD MEDICAL DECISION MAKING





- ED course


Complexity details: reviewed results, re-evaluated patient, considered 

differential, d/w patient


ED course: 





No acute findings on x-ray.  Patient appears to have tenderness over the 

cartilage on the rib.  Likely costochondritis.  We will continue supportive care

and have her follow-up with her doctor.  Patient counseled regarding signs and 

symptoms for which I believe and urgent re-evaluation would be necessary. 

Patient with good understanding of and agreement to plan and is comfortable 

going home at this time





This document was made in part using voice recognition software. While efforts 

are made to proofread this document, sound alike and grammatical errors may 

occur.





Departure





- Departure


Disposition: 01 Home, Self Care


Clinical Impression: 


 Rib pain on left side, Costochondritis





Condition: Good


Instructions:  ED Strain Chest Wall


Follow-Up: 


your,doctor in 1  week [Other]


Prescriptions: 


Hydrocodone/Acetaminophen [Hydrocodon-Acetaminophen 5-325] 1 - 2 each PO Q6H PRN

#14 tablet


 PRN Reason: pain


Lidocaine Patch 5% [Lidoderm Patch] 1 patch TOP DAILY PRN #10 patch


 PRN Reason: pain


Comments: 


Return if you worsen.  Follow-up with your doctor for further care.  Your x-ray 

does not show any acute abnormalities today.  This should improve over the next 

week.


Do not drink alcohol or drive while on narcotic pain medicine. 


Note that many narcotic pain relievers also contain tylenol/acetaminophen. 

Please ensure that your total dose of acetaminophen from all sources does not 

exceed 3 grams (3000mg) per day. 


You may constipated on this medication, take a stool softener such as "Colace" 

twice a day while you are on it. Also recommend a over-the-counter laxative such

as senna or MiraLAX any day that you do not have a bowel movement. 


If you received narcotic pain medication in the emergency department, do not 

drive or operate machinery for the next 24 hours.





Discharge Date/Time: 20 13:57

## 2020-01-11 NOTE — XRAY REPORT
Reason:  L lower rib pain, no injury

Procedure Date:  01/11/2020   

Accession Number:  [id number] / [id number]                    

Procedure:  XR  - Ribs w/PA Chest LT CPT Code:  

 

***Final Report***

 

 

FULL RESULT:

 

 

EXAM:

LEFT RIB RADIOGRAPHY

 

EXAM DATE: 1/11/2020 12:51 PM.

 

CLINICAL HISTORY: Left chest wall pain.

 

COMPARISON: CHEST 2 VIEW 12/09/2018 2:21 AM.

 

TECHNIQUE: 1 view of the chest and 2 views of the ribs.

 

FINDINGS:

Bones: Normal. No fracture or bone lesion.

 

Lungs: No focal opacities. No pneumothorax. No pleural effusions.

 

Mediastinum: Heart and mediastinal contours are unremarkable.

 

Other: None.

IMPRESSION: Normal chest and rib radiography.

 

[facility]

## 2020-01-31 ENCOUNTER — HOSPITAL ENCOUNTER (EMERGENCY)
Dept: HOSPITAL 76 - ED | Age: 30
Discharge: HOME | End: 2020-01-31
Payer: SELF-PAY

## 2020-01-31 VITALS — DIASTOLIC BLOOD PRESSURE: 67 MMHG | SYSTOLIC BLOOD PRESSURE: 101 MMHG

## 2020-01-31 DIAGNOSIS — R07.89: Primary | ICD-10-CM

## 2020-01-31 DIAGNOSIS — F17.200: ICD-10-CM

## 2020-01-31 DIAGNOSIS — R05: ICD-10-CM

## 2020-01-31 PROCEDURE — 71046 X-RAY EXAM CHEST 2 VIEWS: CPT

## 2020-01-31 PROCEDURE — 99284 EMERGENCY DEPT VISIT MOD MDM: CPT

## 2020-01-31 NOTE — ED PHYSICIAN DOCUMENTATION
PD HPI URI





- Stated complaint


Stated Complaint: RIB/CP





- Chief complaint


Chief Complaint: Resp





- History obtained from


History obtained from: Patient





- History of Present Illness


Timing - onset: How many days ago (few)


Timing duration: Days


Timing details: Gradual onset, Still present


Associated symptoms: Dry cough, Chest pain (has had cough for few days and 

started to have pains anterolateral lower ribs/cartilage with coughing and 

movement.).  No: Fever


Contributing factors: No: Sick contact, COPD / asthma


Recently seen: Not recently seen





Review of Systems


Constitutional: denies: Fever


Nose: reports: Congestion.  denies: Rhinorrhea / runny nose


Throat: denies: Sore throat


Cardiac: reports: Chest pain / pressure.  denies: Palpitations


Respiratory: reports: Cough.  denies: Dyspnea


GI: denies: Abdominal Pain, Nausea, Vomiting, Diarrhea


Skin: denies: Rash, Lesions





PD PAST MEDICAL HISTORY





- Past Medical History


Past Medical History: Yes


Cardiovascular: None


Respiratory: None


Neuro: None


Endocrine/Autoimmune: None


GI: None


GYN: Ovarian cysts, Ovarian cancer


: None


HEENT: Chronic hearing loss


Psych: Depression, Anxiety


Musculoskeletal: Other


Derm: None





- Past Surgical History


Past Surgical History: Yes


General: Appendectomy, Other


/GYN:  section, Oophrectomy





- Present Medications


Home Medications: 


                                Ambulatory Orders











 Medication  Instructions  Recorded  Confirmed


 


Hydrocodone/Acetaminophen 1 - 2 each PO Q6H PRN #14 tablet 20 





[Hydrocodon-Acetaminophen 5-325]   


 


Lidocaine Patch 5% [Lidoderm Patch] 1 patch TOP DAILY PRN #10 patch 20 


 


Benzonatate [Tessalon Perle] 100 mg PO TID PRN #30 capsule 20 


 


Hydrocodone/Acetaminophen [Norco 1 each PO Q6H PRN #20 tablet 20 





5-325 Tablet]   


 


Naproxen 375 mg PO BID #20 tablet 20 


 


dexAMETHasone [Decadron] 4 mg PO DAILY #5 tablet 20 














- Allergies


Allergies/Adverse Reactions: 


                                    Allergies











Allergy/AdvReac Type Severity Reaction Status Date / Time


 


adhesive Allergy  Rash Verified 20 09:40


 


Penicillins Allergy  Hives Verified 20 09:40


 


vancomycin Allergy  Rash Verified 20 09:40


 


ketorolac [From Toradol] AdvReac  Headache Verified 01/31/20 09:40


 


latex AdvReac  Rash Verified 20 09:40














- Social History


Does the pt smoke?: Yes


Smoking Status: Current every day smoker


Does the pt drink ETOH?: Yes


Does the pt have substance abuse?: No





- Immunizations


Immunizations are current?: Yes





- POLST


Patient has POLST: No





PD ED PE NORMAL





- Vitals


Vital signs reviewed: Yes





- General


General: Alert and oriented X 3, No acute distress, Well developed/nourished





- HEENT


HEENT: Ears normal, Pharynx benign





- Neck


Neck: Supple, no meningeal sign, No adenopathy





- Cardiac


Cardiac: RRR, No murmur





- Respiratory


Respiratory: Clear bilaterally, Other (some chestwall tenderness lateral lower 

ribs/cartilage. )





- Abdomen


Abdomen: Soft, Non tender





Results





- Vitals


Vitals: 


                                     Oxygen











O2 Source                      Room air

















- Rads (name of study)


  ** chest xray


Radiology: Prelim report reviewed (no acute cardiopulmonary process), See rad 

report





PD MEDICAL DECISION MAKING





- ED course


Complexity details: reviewed results, considered differential, d/w patient





Departure





- Departure


Disposition:  Home, Self Care


Clinical Impression: 


 Cough, Chest wall pain





Condition: Stable


Record reviewed to determine appropriate education?: Yes


Instructions:  ED Chest Pain Costochondritis


Prescriptions: 


Benzonatate [Tessalon Perle] 100 mg PO TID PRN #30 capsule


 PRN Reason: Cough


dexAMETHasone [Decadron] 4 mg PO DAILY #5 tablet


Hydrocodone/Acetaminophen [Norco 5-325 Tablet] 1 each PO Q6H PRN #20 tablet


 PRN Reason: Pain


Naproxen 375 mg PO BID #20 tablet


Comments: 


Your chest x-ray appears normal.  I presume this is some inflammation of the 

cartilage at the lower part of the rib cage related to the coughing.





Tessalon if needed for cough suppression.  Decadron steroid anti-inflammatory 

daily for 5 more days.





Use naproxen anti-inflammatory as well for pain and inflammation twice daily and

to that add Tylenol or hydrocodone if needed for pain.





Recheck if not improving well over the next several days to week.  Return if 

worsening symptoms or other symptoms develop.


Discharge Date/Time: 20 12:18

## 2020-01-31 NOTE — XRAY REPORT
Reason:  dyspnea/ cough/ lower ribs pain

Procedure Date:  01/31/2020   

Accession Number:  [id number] / [id number]                    

Procedure:  XR  - Chest 2 View X-Ray CPT Code:  90595

 

***Final Report***

 

 

FULL RESULT:

 

 

EXAM:

CHEST RADIOGRAPHY

 

EXAM DATE: 01/31/2020 11:42 AM.

 

CLINICAL HISTORY: Dyspnea/cough/lower ribs pain.

 

COMPARISON: RIBS W/PA CHEST LT 01/11/2020 12:40 PM.

 

TECHNIQUE: 2 views.

 

FINDINGS:

Lungs/Pleura: No focal opacities evident. No pleural effusion. No 

pneumothorax. Normal volumes.

 

Mediastinum: Heart and mediastinal contours are unremarkable.

 

Other: None.

IMPRESSION:

No acute cardiopulmonary abnormality.

 

[facility]

## 2020-03-12 ENCOUNTER — HOSPITAL ENCOUNTER (OUTPATIENT)
Dept: HOSPITAL 76 - EMS | Age: 30
Discharge: ANOTHER HOSPITAL | End: 2020-03-12
Attending: SURGERY
Payer: SELF-PAY

## 2020-03-12 ENCOUNTER — HOSPITAL ENCOUNTER (EMERGENCY)
Dept: HOSPITAL 76 - ED | Age: 30
Discharge: HOME | End: 2020-03-12
Payer: SELF-PAY

## 2020-03-12 VITALS — DIASTOLIC BLOOD PRESSURE: 69 MMHG | SYSTOLIC BLOOD PRESSURE: 107 MMHG

## 2020-03-12 DIAGNOSIS — J06.9: Primary | ICD-10-CM

## 2020-03-12 DIAGNOSIS — Z90.710: ICD-10-CM

## 2020-03-12 DIAGNOSIS — Z90.721: ICD-10-CM

## 2020-03-12 DIAGNOSIS — Z85.43: ICD-10-CM

## 2020-03-12 DIAGNOSIS — F17.200: ICD-10-CM

## 2020-03-12 DIAGNOSIS — R51: ICD-10-CM

## 2020-03-12 DIAGNOSIS — R11.2: ICD-10-CM

## 2020-03-12 DIAGNOSIS — R11.2: Primary | ICD-10-CM

## 2020-03-12 LAB
ANION GAP SERPL CALCULATED.4IONS-SCNC: 9 MMOL/L (ref 6–13)
BASOPHILS NFR BLD AUTO: 0 10^3/UL (ref 0–0.1)
BASOPHILS NFR BLD AUTO: 0.4 %
BUN SERPL-MCNC: 11 MG/DL (ref 6–20)
CALCIUM UR-MCNC: 9.5 MG/DL (ref 8.5–10.3)
CHLORIDE SERPL-SCNC: 103 MMOL/L (ref 101–111)
CO2 SERPL-SCNC: 28 MMOL/L (ref 21–32)
CREAT SERPLBLD-SCNC: 0.8 MG/DL (ref 0.4–1)
EOSINOPHIL # BLD AUTO: 0 10^3/UL (ref 0–0.7)
EOSINOPHIL NFR BLD AUTO: 0.3 %
ERYTHROCYTE [DISTWIDTH] IN BLOOD BY AUTOMATED COUNT: 12.3 % (ref 12–15)
GFRSERPLBLD MDRD-ARVRAT: 83 ML/MIN/{1.73_M2} (ref 89–?)
GLUCOSE SERPL-MCNC: 101 MG/DL (ref 70–100)
HGB UR QL STRIP: 14.2 G/DL (ref 12–16)
LYMPHOCYTES # SPEC AUTO: 1.8 10^3/UL (ref 1.5–3.5)
LYMPHOCYTES NFR BLD AUTO: 25.3 %
MCH RBC QN AUTO: 32.6 PG (ref 27–31)
MCHC RBC AUTO-ENTMCNC: 33.5 G/DL (ref 32–36)
MCV RBC AUTO: 97.2 FL (ref 81–99)
MONOCYTES # BLD AUTO: 0.4 10^3/UL (ref 0–1)
MONOCYTES NFR BLD AUTO: 5 %
NEUTROPHILS # BLD AUTO: 4.8 10^3/UL (ref 1.5–6.6)
NEUTROPHILS # SNV AUTO: 7 X10^3/UL (ref 4.8–10.8)
NEUTROPHILS NFR BLD AUTO: 68.9 %
PDW BLD AUTO: 10.3 FL (ref 7.9–10.8)
PLATELET # BLD: 221 10^3/UL (ref 130–450)
RBC MAR: 4.36 10^6/UL (ref 4.2–5.4)
SODIUM SERPLBLD-SCNC: 140 MMOL/L (ref 135–145)

## 2020-03-12 PROCEDURE — 96374 THER/PROPH/DIAG INJ IV PUSH: CPT

## 2020-03-12 PROCEDURE — 99283 EMERGENCY DEPT VISIT LOW MDM: CPT

## 2020-03-12 PROCEDURE — 99284 EMERGENCY DEPT VISIT MOD MDM: CPT

## 2020-03-12 PROCEDURE — 87276 INFLUENZA A AG IF: CPT

## 2020-03-12 PROCEDURE — 87275 INFLUENZA B AG IF: CPT

## 2020-03-12 PROCEDURE — 36415 COLL VENOUS BLD VENIPUNCTURE: CPT

## 2020-03-12 PROCEDURE — 85025 COMPLETE CBC W/AUTO DIFF WBC: CPT

## 2020-03-12 PROCEDURE — 80048 BASIC METABOLIC PNL TOTAL CA: CPT

## 2020-03-12 NOTE — ED PHYSICIAN DOCUMENTATION
History of Present Illness





- Stated complaint


Stated Complaint: HA, VOMITING, DRY HEAVES





- Chief complaint


Chief Complaint: Abd Pain





- History obtained from


History obtained from: Patient (30-year-old female comes in tonight via EMS with

chief complaint of a headache fever nausea vomiting diaphoresis and a kind of 

global headache ongoing for the past approximately 7 to 8 hours.  She is a store

 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  She is a cancers survivor of ovarian cancer 

approximately 11 years now after oophorectomy and hysterectomy.  She was given 1

dose of Zofran in route via EMS, she states this relieved her nausea.  She is 

having some increased nausea now would like some more.  Patient denies any 

shortness of breath wheezing or cough.)





Review of Systems


Constitutional: reports: Fever, Chills, Fatigue


Eyes: reports: Reviewed and negative


Ears: reports: Loss of hearing, Other (Chronic hearing loss since birth 

secondary to birth defect.)


Nose: reports: Rhinorrhea / runny nose


Throat: reports: Reviewed and negative


Cardiac: reports: Reviewed and negative


Respiratory: reports: Reviewed and negative


GI: reports: Reviewed and negative


: reports: Reviewed and negative


Skin: reports: Reviewed and negative





PD PAST MEDICAL HISTORY





- Past Medical History


Past Medical History: Yes


Cardiovascular: None


Respiratory: None


Neuro: None


Endocrine/Autoimmune: None


GI: None


GYN: Ovarian cysts, Ovarian cancer


: None


HEENT: Chronic hearing loss


Psych: Depression, Anxiety


Musculoskeletal: Other


Derm: None


Other Past Medical History: STAGE 3 OVARIAN CANCER/ REMISSION...





- Past Surgical History


Past Surgical History: Yes


General: Appendectomy, Other


/GYN:  section, Oophrectomy





- Present Medications


Home Medications: 


                                Ambulatory Orders











 Medication  Instructions  Recorded  Confirmed


 


Hydrocodone/Acetaminophen 1 - 2 each PO Q6H PRN #14 tablet 20 





[Hydrocodon-Acetaminophen 5-325]   


 


Lidocaine Patch 5% [Lidoderm Patch] 1 patch TOP DAILY PRN #10 patch 20 


 


Benzonatate [Tessalon Perle] 100 mg PO TID PRN #30 capsule 20 


 


Hydrocodone/Acetaminophen [Norco 1 each PO Q6H PRN #20 tablet 20 





5-325 Tablet]   


 


Naproxen 375 mg PO BID #20 tablet 20 


 


dexAMETHasone [Decadron] 4 mg PO DAILY #5 tablet 20 


 


Ondansetron Odt [Zofran] 4 mg TL Q6H PRN #10 tablet 20 














- Allergies


Allergies/Adverse Reactions: 


                                    Allergies











Allergy/AdvReac Type Severity Reaction Status Date / Time


 


adhesive Allergy  Rash Verified 20 21:01


 


Penicillins Allergy  Hives Verified 20 21:01


 


vancomycin Allergy  Rash Verified 20 21:01


 


ketorolac [From Toradol] AdvReac  Headache Verified 20 21:01


 


latex AdvReac  Rash Verified 20 21:01














- Social History


Does the pt smoke?: Yes


Smoking Status: Current every day smoker


Does the pt drink ETOH?: No


Does the pt have substance abuse?: No





- Immunizations


Immunizations are current?: Yes





- POLST


Patient has POLST: No





PD ED PE NORMAL





- General


General: Alert and oriented X 3, Well developed/nourished





- HEENT


HEENT: Atraumatic, PERRL, EOMI, Ears normal, Moist mucous membranes, Pharynx 

benign





- Neck


Neck: No adenopathy





- Cardiac


Cardiac: RRR, No murmur





- Respiratory


Respiratory: No respiratory distress, Clear bilaterally





- Abdomen


Abdomen: Soft, Non tender





Results





- Vitals


Vitals: 


                               Vital Signs - 24 hr











  20





  20:59 22:41 22:44


 


Temperature 36.8 C  


 


Heart Rate 86 53 L 


 


Respiratory 19 17 16





Rate   


 


Blood Pressure 102/85 H 103/48 L 


 


O2 Saturation 100 98 








                                     Oxygen











O2 Source                      Room air

















- Labs


Labs: 


                                Laboratory Tests











  20





  21:02 21:02 21:02


 


WBC   7.0 


 


RBC   4.36 


 


Hgb   14.2 


 


Hct   42.4 


 


MCV   97.2 


 


MCH   32.6 H 


 


MCHC   33.5 


 


RDW   12.3 


 


Plt Count   221 


 


MPV   10.3 


 


Neut # (Auto)   4.8 


 


Lymph # (Auto)   1.8 


 


Mono # (Auto)   0.4 


 


Eos # (Auto)   0.0 


 


Baso # (Auto)   0.0 


 


Absolute Nucleated RBC   0.00 


 


Nucleated RBC %   0.0 


 


Sodium  140  


 


Potassium  3.8  


 


Chloride  103  


 


Carbon Dioxide  28  


 


Anion Gap  9.0  


 


BUN  11  


 


Creatinine  0.8  


 


Estimated GFR (MDRD)  83 L  


 


Glucose  101 H  


 


Calcium  9.5  


 


Influenza A (Rapid)    Negative


 


Influenza B (Rapid)    Negative














PD MEDICAL DECISION MAKING





- ED course


Complexity details: reviewed results, re-evaluated patient, d/w patient, d/w 

family





Departure





- Departure


Disposition: 01 Home, Self Care


Clinical Impression: 


 Viral URI





Condition: Good


Instructions:  ED URI Viral


Prescriptions: 


Ondansetron Odt [Zofran] 4 mg TL Q6H PRN #10 tablet


 PRN Reason: Nausea / Vomiting


Comments: 


Your flu swab today was negative.  You do not have a elevated white blood cell 

count.  As we discussed you most likely have a viral upper respiratory infection

 given your symptoms. Take Ibuprofen and Tylenol to help with fever and pain 

control.  I have given you a prescription for Zofran 4 mg #10, take 1 tablet 

every 6 hours for nausea.  Make sure you stay well-hydrated and drink plenty of 

clear fluids.  Should your symptoms fail to improve in the next 3 to 5 days 

follow-up with your primary care physician.  If they worsen you are welcome to 

return to the ER for further evaluation.

## 2020-04-08 ENCOUNTER — HOSPITAL ENCOUNTER (EMERGENCY)
Dept: HOSPITAL 76 - ED | Age: 30
Discharge: HOME | End: 2020-04-08
Payer: SELF-PAY

## 2020-04-08 VITALS — DIASTOLIC BLOOD PRESSURE: 67 MMHG | SYSTOLIC BLOOD PRESSURE: 96 MMHG

## 2020-04-08 DIAGNOSIS — F17.200: ICD-10-CM

## 2020-04-08 DIAGNOSIS — K62.5: Primary | ICD-10-CM

## 2020-04-08 LAB
ALBUMIN DIAFP-MCNC: 4.9 G/DL (ref 3.2–5.5)
ALBUMIN/GLOB SERPL: 1.6 {RATIO} (ref 1–2.2)
ALP SERPL-CCNC: 69 IU/L (ref 42–121)
ALT SERPL W P-5'-P-CCNC: 13 IU/L (ref 10–60)
ANION GAP SERPL CALCULATED.4IONS-SCNC: 8 MMOL/L (ref 6–13)
AST SERPL W P-5'-P-CCNC: 14 IU/L (ref 10–42)
BASOPHILS NFR BLD AUTO: 0.1 10^3/UL (ref 0–0.1)
BASOPHILS NFR BLD AUTO: 0.8 %
BILIRUB BLD-MCNC: 0.5 MG/DL (ref 0.2–1)
BUN SERPL-MCNC: 11 MG/DL (ref 6–20)
CALCIUM UR-MCNC: 9.4 MG/DL (ref 8.5–10.3)
CHLORIDE SERPL-SCNC: 101 MMOL/L (ref 101–111)
CLARITY UR REFRACT.AUTO: CLEAR
CO2 SERPL-SCNC: 29 MMOL/L (ref 21–32)
CREAT SERPLBLD-SCNC: 0.8 MG/DL (ref 0.4–1)
EOSINOPHIL # BLD AUTO: 0.1 10^3/UL (ref 0–0.7)
EOSINOPHIL NFR BLD AUTO: 1 %
ERYTHROCYTE [DISTWIDTH] IN BLOOD BY AUTOMATED COUNT: 12.4 % (ref 12–15)
GLOBULIN SER-MCNC: 3.1 G/DL (ref 2.1–4.2)
GLUCOSE SERPL-MCNC: 93 MG/DL (ref 70–100)
GLUCOSE UR QL STRIP.AUTO: NEGATIVE MG/DL
HGB UR QL STRIP: 14.1 G/DL (ref 12–16)
KETONES UR QL STRIP.AUTO: NEGATIVE MG/DL
LYMPHOCYTES # SPEC AUTO: 3 10^3/UL (ref 1.5–3.5)
LYMPHOCYTES NFR BLD AUTO: 48 %
MCH RBC QN AUTO: 31.8 PG (ref 27–31)
MCHC RBC AUTO-ENTMCNC: 32.9 G/DL (ref 32–36)
MCV RBC AUTO: 96.6 FL (ref 81–99)
MONOCYTES # BLD AUTO: 0.5 10^3/UL (ref 0–1)
MONOCYTES NFR BLD AUTO: 8.2 %
NEUTROPHILS # BLD AUTO: 2.6 10^3/UL (ref 1.5–6.6)
NEUTROPHILS # SNV AUTO: 6.2 X10^3/UL (ref 4.8–10.8)
NEUTROPHILS NFR BLD AUTO: 41.7 %
NITRITE UR QL STRIP.AUTO: NEGATIVE
PDW BLD AUTO: 9.5 FL (ref 7.9–10.8)
PH UR STRIP.AUTO: 6 PH (ref 5–7.5)
PLATELET # BLD: 250 10^3/UL (ref 130–450)
PROT SPEC-MCNC: 8 G/DL (ref 6.7–8.2)
PROT UR STRIP.AUTO-MCNC: NEGATIVE MG/DL
RBC # UR STRIP.AUTO: NEGATIVE /UL
RBC MAR: 4.43 10^6/UL (ref 4.2–5.4)
SODIUM SERPLBLD-SCNC: 138 MMOL/L (ref 135–145)
SP GR UR STRIP.AUTO: 1.01 (ref 1–1.03)
UROBILINOGEN UR QL STRIP.AUTO: (no result) E.U./DL
UROBILINOGEN UR STRIP.AUTO-MCNC: NEGATIVE MG/DL

## 2020-04-08 PROCEDURE — 99284 EMERGENCY DEPT VISIT MOD MDM: CPT

## 2020-04-08 PROCEDURE — 99283 EMERGENCY DEPT VISIT LOW MDM: CPT

## 2020-04-08 PROCEDURE — 81001 URINALYSIS AUTO W/SCOPE: CPT

## 2020-04-08 PROCEDURE — 81003 URINALYSIS AUTO W/O SCOPE: CPT

## 2020-04-08 PROCEDURE — 85025 COMPLETE CBC W/AUTO DIFF WBC: CPT

## 2020-04-08 PROCEDURE — 36415 COLL VENOUS BLD VENIPUNCTURE: CPT

## 2020-04-08 PROCEDURE — 80053 COMPREHEN METABOLIC PANEL: CPT

## 2020-04-08 PROCEDURE — 87086 URINE CULTURE/COLONY COUNT: CPT

## 2020-04-08 NOTE — ED PHYSICIAN DOCUMENTATION
History of Present Illness





- Stated complaint


Stated Complaint: RECTAL BLEED





- Chief complaint


Chief Complaint: Abd Pain





- History obtained from


History obtained from: Patient (Please see separate note from nurse practitioner

breanne lezama for further details.)





Review of Systems


Constitutional: reports: Reviewed and negative


Eyes: reports: Reviewed and negative


Ears: reports: Reviewed and negative


Nose: reports: Reviewed and negative


Throat: reports: Reviewed and negative


Cardiac: reports: Reviewed and negative


Respiratory: reports: Reviewed and negative


GI: reports: Other (Rectal bleeding)


: reports: Reviewed and negative


Skin: reports: Reviewed and negative


Musculoskeletal: reports: Reviewed and negative


Neurologic: reports: Reviewed and negative


Psychiatric: reports: Reviewed and negative


Endocrine: reports: Reviewed and negative


Immunocompromised: reports: Reviewed and negative





PD PAST MEDICAL HISTORY





- Past Medical History


Past Medical History: Yes


Cardiovascular: None


Respiratory: None


Neuro: None


Endocrine/Autoimmune: None


GI: None


GYN: Ovarian cysts, Ovarian cancer


: None


HEENT: Chronic hearing loss


Psych: Depression, Anxiety


Musculoskeletal: Other


Derm: None





- Past Surgical History


Past Surgical History: Yes


General: Appendectomy, Other


/GYN:  section, Oophrectomy





- Present Medications


Home Medications: 


                                Ambulatory Orders











 Medication  Instructions  Recorded  Confirmed


 


Hydrocodone/Acetaminophen 1 - 2 each PO Q6H PRN #14 tablet 20 





[Hydrocodon-Acetaminophen 5-325]   


 


Lidocaine Patch 5% [Lidoderm Patch] 1 patch TOP DAILY PRN #10 patch 20 


 


Benzonatate [Tessalon Perle] 100 mg PO TID PRN #30 capsule 20 


 


Hydrocodone/Acetaminophen [Norco 1 each PO Q6H PRN #20 tablet 20 





5-325 Tablet]   


 


Naproxen 375 mg PO BID #20 tablet 20 


 


dexAMETHasone [Decadron] 4 mg PO DAILY #5 tablet 20 


 


Ondansetron Odt [Zofran] 4 mg TL Q6H PRN #10 tablet 20 














- Allergies


Allergies/Adverse Reactions: 


                                    Allergies











Allergy/AdvReac Type Severity Reaction Status Date / Time


 


adhesive Allergy  Rash Verified 20 22:01


 


Penicillins Allergy  Hives Verified 20 22:01


 


vancomycin Allergy  Rash Verified 20 22:01


 


ketorolac [From Toradol] AdvReac  Headache Verified 20 22:01


 


latex AdvReac  Rash Verified 20 22:01














- Social History


Does the pt smoke?: Yes


Smoking Status: Current every day smoker


Does the pt drink ETOH?: No


Does the pt have substance abuse?: No





- Immunizations


Immunizations are current?: Yes





- POLST


Patient has POLST: No





PD ED PE NORMAL





- Vitals


Vital signs reviewed: Yes





- General


General: Alert and oriented X 3, No acute distress





- HEENT


HEENT: PERRL





- Neck


Neck: Supple, no meningeal sign





- Cardiac


Cardiac: RRR, No murmur





- Respiratory


Respiratory: Clear bilaterally





- Abdomen


Abdomen: Normal bowel sounds, Soft, Non tender, Non distended





- Derm


Derm: Warm and dry





- Extremities


Extremities: No deformity





- Neuro


Neuro: Alert and oriented X 3





- Psych


Psych: Normal mood, Normal affect





Results





- Vitals


Vitals: 





                               Vital Signs - 24 hr











  20





  21:56 22:45


 


Temperature 36.9 C 


 


Heart Rate 94 


 


Respiratory 16 16





Rate  


 


Blood Pressure 122/77 


 


O2 Saturation 98 








                                     Oxygen











O2 Source                      Room air

















- Labs


Labs: 





                                Laboratory Tests











  20





  23:07 23:07 23:10


 


WBC  6.2  


 


RBC  4.43  


 


Hgb  14.1  


 


Hct  42.8  


 


MCV  96.6  


 


MCH  31.8 H  


 


MCHC  32.9  


 


RDW  12.4  


 


Plt Count  250  


 


MPV  9.5  


 


Neut # (Auto)  2.6  


 


Lymph # (Auto)  3.0  


 


Mono # (Auto)  0.5  


 


Eos # (Auto)  0.1  


 


Baso # (Auto)  0.1  


 


Absolute Nucleated RBC  0.00  


 


Nucleated RBC %  0.0  


 


Sodium   138 


 


Potassium   3.5 


 


Chloride   101 


 


Carbon Dioxide   29 


 


Anion Gap   8.0 


 


BUN   11 


 


Creatinine   0.8 


 


Estimated GFR (MDRD)   83 L 


 


Glucose   93 


 


Calcium   9.4 


 


Total Bilirubin   0.5 


 


AST   14 


 


ALT   13 


 


Alkaline Phosphatase   69 


 


Total Protein   8.0 


 


Albumin   4.9 


 


Globulin   3.1 


 


Albumin/Globulin Ratio   1.6 


 


Urine Color    YELLOW


 


Urine Clarity    CLEAR


 


Urine pH    6.0


 


Ur Specific Gravity    1.010


 


Urine Protein    NEGATIVE


 


Urine Glucose (UA)    NEGATIVE


 


Urine Ketones    NEGATIVE


 


Urine Occult Blood    NEGATIVE


 


Urine Nitrite    NEGATIVE


 


Urine Bilirubin    NEGATIVE


 


Urine Urobilinogen    0.2 (NORMAL)


 


Ur Leukocyte Esterase    NEGATIVE


 


Ur Microscopic Review    NOT INDICATED


 


Urine Culture Comments    NOT INDICATED














PD MEDICAL DECISION MAKING





- ED course


Complexity details: other (Patient signed out to me at shift change by nurse 

practitioner Jyotsna lezama. Patient was updated on her lab results no signs 

of acute hemorrhage or blood loss or anemia plan is for follow-up with her 

primary care provider tomorrow and schedule colonoscopy in the next 2 weeks if 

available.)





Departure





- Departure


Disposition: 01 Home, Self Care


Clinical Impression: 


 Rectal bleeding





Condition: Stable


Instructions:  Bleeding Rectal


Follow-Up: 


your, doctor [Other] - Tomorrow


Comments: 


Follow-up with your primary care provider tomorrow if you have a 

gastroenterologist or general surgeon follow-up with them this week.  Call your 

primary care provider or gastroenterologist or general surgeon to schedule a 

colonoscopy.

## 2020-04-08 NOTE — ED PHYSICIAN DOCUMENTATION
History of Present Illness





- Stated complaint


Stated Complaint: RECTAL BLEED





- Chief complaint


Chief Complaint: Abd Pain





- History obtained from


History obtained from: Patient (This is a 29 yo F w a pmh of ovarian ca now s/p 

bilateral oopherectomy and appendectomy who presents with blood per rectum. Pt 

states for the last month she has periodically had blood on the tissue when she 

wipes, but also has noted some bright red blood in the toilet at times or in her

underwear. She thought perhaps she was starting her menses, but noted the blood 

to be coming from her rectum. Today she states there was so much blood "I could 

wring out my underwear." She has chronic pelvic pain and generalized abdominal 

pain and this is not new or worse. She has no weakness or dizziness, no 

palpitations, no cp, dyspnea, n/v, diarrhea, or constipation. She denies any 

urinary sx.)





Review of Systems


Constitutional: reports: Reviewed and negative


Cardiac: reports: Reviewed and negative


Respiratory: reports: Reviewed and negative


GI: reports: Bloody / black stool.  denies: Abdominal Pain, Abdominal Swelling, 

Nausea, Vomiting, Constipation, Diarrhea, Hematemesis


: reports: Reviewed and negative





PD PAST MEDICAL HISTORY





- Past Medical History


Past Medical History: Yes


Cardiovascular: None


Respiratory: None


Neuro: None


Endocrine/Autoimmune: None


GI: None


GYN: Ovarian cysts, Ovarian cancer


: None


HEENT: Chronic hearing loss


Psych: Depression, Anxiety


Musculoskeletal: Other


Derm: None





- Past Surgical History


Past Surgical History: Yes


General: Appendectomy, Other


/GYN:  section, Oophrectomy





- Present Medications


Home Medications: 


                                Ambulatory Orders











 Medication  Instructions  Recorded  Confirmed


 


Hydrocodone/Acetaminophen 1 - 2 each PO Q6H PRN #14 tablet 20 





[Hydrocodon-Acetaminophen 5-325]   


 


Lidocaine Patch 5% [Lidoderm Patch] 1 patch TOP DAILY PRN #10 patch 20 


 


Benzonatate [Tessalon Perle] 100 mg PO TID PRN #30 capsule 20 


 


Hydrocodone/Acetaminophen [Norco 1 each PO Q6H PRN #20 tablet 20 





5-325 Tablet]   


 


Naproxen 375 mg PO BID #20 tablet 20 


 


dexAMETHasone [Decadron] 4 mg PO DAILY #5 tablet 20 


 


Ondansetron Odt [Zofran] 4 mg TL Q6H PRN #10 tablet 20 














- Allergies


Allergies/Adverse Reactions: 


                                    Allergies











Allergy/AdvReac Type Severity Reaction Status Date / Time


 


adhesive Allergy  Rash Verified 20 22:01


 


Penicillins Allergy  Hives Verified 20 22:01


 


vancomycin Allergy  Rash Verified 20 22:01


 


ketorolac [From Toradol] AdvReac  Headache Verified 20 22:01


 


latex AdvReac  Rash Verified 20 22:01














- Social History


Does the pt smoke?: Yes


Smoking Status: Current every day smoker


Does the pt drink ETOH?: No


Does the pt have substance abuse?: No





- Immunizations


Immunizations are current?: Yes





- POLST


Patient has POLST: No





PD ED PE NORMAL





- Vitals


Vital signs reviewed: Yes





- General


General: Alert and oriented X 3, No acute distress, Well developed/nourished, 

Other (thin)





- Cardiac


Cardiac: RRR, No murmur, No gallop, No rub, Strong equal pulses





- Respiratory


Respiratory: No respiratory distress, Clear bilaterally





- Abdomen


Abdomen: Normal bowel sounds, Soft, Non tender, Non distended, No organomegaly





- Rectal


Rectal: Other (Pt has a small skin tag as well as a hemorrhoid, no fissures. 

There is a small amt of dried blood on the rectum. No current bleeding, no stool

)





Results





- Vitals


Vitals: 





                               Vital Signs - 24 hr











  20





  21:56 22:45


 


Temperature 36.9 C 


 


Heart Rate 94 


 


Respiratory 16 16





Rate  


 


Blood Pressure 122/77 


 


O2 Saturation 98 








                                     Oxygen











O2 Source                      Room air

















PD MEDICAL DECISION MAKING





- ED course


ED course: 


Pt seen, physical exam performed, and basic labs ordered. Results are pending. I

will turn this patient over to Dr. Swenson to follow up on labs and make 

disposition.

## 2020-06-18 ENCOUNTER — HOSPITAL ENCOUNTER (EMERGENCY)
Dept: HOSPITAL 76 - ED | Age: 30
LOS: 1 days | Discharge: HOME | End: 2020-06-19
Payer: SELF-PAY
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
## 2020-06-18 DIAGNOSIS — F17.200: ICD-10-CM

## 2020-06-18 DIAGNOSIS — S99.921A: Primary | ICD-10-CM

## 2020-06-18 PROCEDURE — 99282 EMERGENCY DEPT VISIT SF MDM: CPT

## 2020-06-18 PROCEDURE — 99283 EMERGENCY DEPT VISIT LOW MDM: CPT

## 2020-06-18 NOTE — ED PHYSICIAN DOCUMENTATION
History of Present Illness





- Stated complaint


Stated Complaint: R TOE INJ





- Chief complaint


Chief Complaint: Ext Problem





- History obtained from


History obtained from: Patient (Patient is a 30-year-old female who reports that

she stubbed her toe on her right foot denies any other complaints.)





Review of Systems


Constitutional: reports: Reviewed and negative


Eyes: reports: Reviewed and negative


Ears: reports: Reviewed and negative


Nose: reports: Reviewed and negative


Throat: reports: Reviewed and negative


Cardiac: reports: Reviewed and negative


Respiratory: reports: Reviewed and negative


GI: reports: Reviewed and negative


: reports: Reviewed and negative


Skin: reports: Reviewed and negative


Musculoskeletal: reports: Extremity pain


Neurologic: reports: Reviewed and negative


Psychiatric: reports: Reviewed and negative


Endocrine: reports: Reviewed and negative


Immunocompromised: reports: Reviewed and negative





PD PAST MEDICAL HISTORY





- Past Medical History


Past Medical History: Yes


Cardiovascular: None


Respiratory: None


Neuro: None


Endocrine/Autoimmune: None


GI: None


GYN: Ovarian cysts, Ovarian cancer


: None


HEENT: Chronic hearing loss


Psych: Depression, Anxiety


Musculoskeletal: Other


Derm: None





- Past Surgical History


Past Surgical History: Yes


General: Appendectomy, Other


/GYN:  section, Oophrectomy





- Present Medications


Home Medications: 


                                Ambulatory Orders











 Medication  Instructions  Recorded  Confirmed


 


Hydrocodone/Acetaminophen 1 - 2 each PO Q6H PRN #14 tablet 20 





[Hydrocodon-Acetaminophen 5-325]   


 


Lidocaine Patch 5% [Lidoderm Patch] 1 patch TOP DAILY PRN #10 patch 20 


 


Benzonatate [Tessalon Perle] 100 mg PO TID PRN #30 capsule 20 


 


Hydrocodone/Acetaminophen [Norco 1 each PO Q6H PRN #20 tablet 20 





5-325 Tablet]   


 


Naproxen 375 mg PO BID #20 tablet 20 


 


dexAMETHasone [Decadron] 4 mg PO DAILY #5 tablet 20 


 


Ondansetron Odt [Zofran] 4 mg TL Q6H PRN #10 tablet 20 














- Allergies


Allergies/Adverse Reactions: 


                                    Allergies











Allergy/AdvReac Type Severity Reaction Status Date / Time


 


adhesive Allergy  Rash Verified 20 23:34


 


Penicillins Allergy  Hives Verified 20 23:34


 


vancomycin Allergy  Rash Verified 20 23:34


 


ketorolac [From Toradol] AdvReac  Headache Verified 20 23:34


 


latex AdvReac  Rash Verified 20 23:34














- Social History


Does the pt smoke?: Yes


Smoking Status: Current every day smoker


Does the pt drink ETOH?: No


Does the pt have substance abuse?: No





- Immunizations


Immunizations are current?: Yes





- POLST


Patient has POLST: No





PD ED PE NORMAL





- Vitals


Vital signs reviewed: Yes





- General


General: Alert and oriented X 3, No acute distress





- HEENT


HEENT: PERRL





- Neck


Neck: Supple, no meningeal sign





- Cardiac


Cardiac: RRR, No murmur





- Respiratory


Respiratory: Clear bilaterally





- Abdomen


Abdomen: Normal bowel sounds, Soft, Non tender, Non distended





- Derm


Derm: Warm and dry





- Extremities


Extremities: No deformity, No tenderness to palpate, Normal ROM s pain, No 

edema, No calf tenderness / cord, Other (No deformity of the right foot or ankle

or toes there is no bruising, no ecchymosis no swelling patient is able to bear 

weight and ambulate palpable DP and PT pulses no pain over the base of the fifth

metatarsal no pain over the navicular bone or the calcaneus or the medial 

lateral malleolus.)





- Neuro


Neuro: Alert and oriented X 3





- Psych


Psych: Normal mood, Normal affect





Results





- Vitals


Vitals: 


                               Vital Signs - 24 hr











  20





  23:31 00:50


 


Temperature 37 C 


 


Heart Rate 94 67


 


Respiratory 17 16





Rate  


 


Blood Pressure 129/69 104/67


 


O2 Saturation 99 100








                                     Oxygen











O2 Source                      Room air

















PD MEDICAL DECISION MAKING





- ED course


Complexity details: reviewed results, re-evaluated patient, considered 

differential (foot injury. x rays are negative. pe unremarkable. ), d/w patient





Departure





- Departure


Disposition: 01 Home, Self Care


Clinical Impression: 


Right foot injury


Qualifiers:


 Encounter type: initial encounter Qualified Code(s): S99.921A - Unspecified 

injury of right foot, initial encounter





Condition: Stable


Instructions:  Metatarsalgia Tx


Follow-Up: 


your, doctor [Other]


Comments: 


ice as needed several times daily. take ibuprofen or tylenol as needed. follow 

up with your primary care provider tomorrow.


Discharge Date/Time: 20 00:55

## 2020-06-19 VITALS — DIASTOLIC BLOOD PRESSURE: 67 MMHG | SYSTOLIC BLOOD PRESSURE: 104 MMHG

## 2020-06-19 NOTE — XRAY REPORT
PROCEDURE:  Foot 3 View RT

 

INDICATIONS:  foot pain

 

TECHNIQUE:  3 views of the foot were acquired.  

 

COMPARISON:  None

 

FINDINGS:  

 

Bones:  No fractures or dislocations.  No suspicious bony lesions.  

 

Soft tissues:  No tibiotalar joint effusion.  Achilles tendon appears normal.  

 

 

IMPRESSION:  

 

No fracture. No osseous lesion. If there is continued clinical concern for pathology, then repeat fabien
in film radiographs (7-10 days) or advanced imaging (CT, MR, bone scan) should be considered for furt
her evaluation.

 

Reviewed by: Stacy Anderson MD, PhD on 6/19/2020 7:41 AM PDT

Approved by: Stacy Anderson MD, PhD on 6/19/2020 7:41 AM PDT

 

 

Station ID:  [id number]

## 2020-12-14 ENCOUNTER — HOSPITAL ENCOUNTER (EMERGENCY)
Dept: HOSPITAL 76 - ED | Age: 30
Discharge: HOME | End: 2020-12-14
Payer: SELF-PAY

## 2020-12-14 VITALS — DIASTOLIC BLOOD PRESSURE: 65 MMHG | SYSTOLIC BLOOD PRESSURE: 100 MMHG

## 2020-12-14 DIAGNOSIS — F17.200: ICD-10-CM

## 2020-12-14 DIAGNOSIS — K11.20: Primary | ICD-10-CM

## 2020-12-14 LAB
ANION GAP SERPL CALCULATED.4IONS-SCNC: 8 MMOL/L (ref 6–13)
BASOPHILS NFR BLD AUTO: 0 10^3/UL (ref 0–0.1)
BASOPHILS NFR BLD AUTO: 0.8 %
BUN SERPL-MCNC: 15 MG/DL (ref 6–20)
CALCIUM UR-MCNC: 9.7 MG/DL (ref 8.5–10.3)
CHLORIDE SERPL-SCNC: 104 MMOL/L (ref 101–111)
CO2 SERPL-SCNC: 28 MMOL/L (ref 21–32)
CREAT SERPLBLD-SCNC: 0.8 MG/DL (ref 0.4–1)
EOSINOPHIL # BLD AUTO: 0.2 10^3/UL (ref 0–0.7)
EOSINOPHIL NFR BLD AUTO: 4 %
ERYTHROCYTE [DISTWIDTH] IN BLOOD BY AUTOMATED COUNT: 12.5 % (ref 12–15)
GLUCOSE SERPL-MCNC: 88 MG/DL (ref 70–100)
HGB UR QL STRIP: 14.4 G/DL (ref 12–16)
LYMPHOCYTES # SPEC AUTO: 2 10^3/UL (ref 1.5–3.5)
LYMPHOCYTES NFR BLD AUTO: 41.5 %
MCH RBC QN AUTO: 33.3 PG (ref 27–31)
MCHC RBC AUTO-ENTMCNC: 33.9 G/DL (ref 32–36)
MCV RBC AUTO: 98.2 FL (ref 81–99)
MONOCYTES # BLD AUTO: 0.4 10^3/UL (ref 0–1)
MONOCYTES NFR BLD AUTO: 8.1 %
NEUTROPHILS # BLD AUTO: 2.2 10^3/UL (ref 1.5–6.6)
NEUTROPHILS # SNV AUTO: 4.7 X10^3/UL (ref 4.8–10.8)
NEUTROPHILS NFR BLD AUTO: 45.6 %
PDW BLD AUTO: 9.5 FL (ref 7.9–10.8)
PLATELET # BLD: 221 10^3/UL (ref 130–450)
RBC MAR: 4.33 10^6/UL (ref 4.2–5.4)
SODIUM SERPLBLD-SCNC: 140 MMOL/L (ref 135–145)

## 2020-12-14 PROCEDURE — 36415 COLL VENOUS BLD VENIPUNCTURE: CPT

## 2020-12-14 PROCEDURE — 99284 EMERGENCY DEPT VISIT MOD MDM: CPT

## 2020-12-14 PROCEDURE — 70491 CT SOFT TISSUE NECK W/DYE: CPT

## 2020-12-14 PROCEDURE — 85025 COMPLETE CBC W/AUTO DIFF WBC: CPT

## 2020-12-14 PROCEDURE — 96374 THER/PROPH/DIAG INJ IV PUSH: CPT

## 2020-12-14 PROCEDURE — 80048 BASIC METABOLIC PNL TOTAL CA: CPT

## 2020-12-14 NOTE — ED PHYSICIAN DOCUMENTATION
PD HPI HEENT





- Stated complaint


Stated Complaint: NECK PX





- Chief complaint


Chief Complaint: Heent





- History obtained from


History obtained from: Patient





- Additional information


Additional information: 





She developed a lump and sided left-sided neck pain last night.  It is 

associated with a feeling of a sour taste in her throat.  She does have some bad

teeth but mostly on the other side.  Denies fevers or sore throat but she does 

have some feeling of chills.  No possibility of pregnancy as she has had an 

oophorectomy on both sides.





Review of Systems


Constitutional: reports: Reviewed and negative


Eyes: reports: Reviewed and negative


Ears: reports: Reviewed and negative


Nose: reports: Reviewed and negative





PD PAST MEDICAL HISTORY





- Past Medical History


Cardiovascular: None


Respiratory: None


Neuro: None


Endocrine/Autoimmune: None


GI: None


GYN: Ovarian cysts, Ovarian cancer


: None


HEENT: Chronic hearing loss


Psych: Depression, Anxiety


Musculoskeletal: Other


Derm: None





- Past Surgical History


Past Surgical History: Yes


General: Appendectomy, Other


/GYN:  section, Oophrectomy





- Present Medications


Home Medications: 


                                Ambulatory Orders











 Medication  Instructions  Recorded  Confirmed


 


Hydrocodone/Acetaminophen 1 - 2 each PO Q6H PRN #14 tablet 20 





[Hydrocodon-Acetaminophen 5-325]   


 


Lidocaine Patch 5% [Lidoderm Patch] 1 patch TOP DAILY PRN #10 patch 20 


 


Benzonatate [Tessalon Perle] 100 mg PO TID PRN #30 capsule 20 


 


Hydrocodone/Acetaminophen [Norco 1 each PO Q6H PRN #20 tablet 20 





5-325 Tablet]   


 


Naproxen 375 mg PO BID #20 tablet 20 


 


dexAMETHasone [Decadron] 4 mg PO DAILY #5 tablet 20 


 


Ondansetron Odt [Zofran] 4 mg TL Q6H PRN #10 tablet 20 


 


Clindamycin [Cleocin] 300 mg PO Q6H 7 Days  capsule 20 


 


HYDROcod/ACETAM 5/325 [Norco 5/325] 1 - 2 tab PO Q6H PRN #15 tablet 20 














- Allergies


Allergies/Adverse Reactions: 


                                    Allergies











Allergy/AdvReac Type Severity Reaction Status Date / Time


 


adhesive Allergy  Rash Verified 20 09:15


 


Penicillins Allergy  Hives Verified 20 09:15


 


vancomycin Allergy  Rash Verified 20 09:15


 


ketorolac [From Toradol] AdvReac  Headache Verified 20 09:15


 


latex AdvReac  Rash Verified 20 09:15














- Social History


Does the pt smoke?: Yes


Smoking Status: Current every day smoker


Does the pt drink ETOH?: No


Does the pt have substance abuse?: No





- Immunizations


Immunizations are current?: Yes





- POLST


Patient has POLST: No





PD ED PE NORMAL





- Vitals


Vital signs reviewed: Yes





- General


General: Alert and oriented X 3, No acute distress





- HEENT


HEENT: PERRL, EOMI, Other (She has a tender mass smaller than a golf ball under 

the angle of the left jaw.  I do not really appreciate any tenderness of the 

sublingual area.  No trismus.  Oropharynx looks normal.)





- Neck


Neck: Supple, no meningeal sign, No bony TTP





- Neuro


Neuro: Alert and oriented X 3, CNs 2-12 intact, No motor deficit, No sensory 

deficit, Normal speech





Results





- Vitals


Vitals: 


                               Vital Signs - 24 hr











  20





  09:11 10:12 11:17


 


Temperature 36.2 C L  


 


Heart Rate 89 60 65


 


Respiratory 16 18 18





Rate   


 


Blood Pressure 129/85 H 96/63 100/65


 


O2 Saturation 100 99 100








                                     Oxygen











O2 Source                      Room air

















- Labs


Labs: 


                                Laboratory Tests











  20





  09:51 09:51


 


WBC  4.7 L 


 


RBC  4.33 


 


Hgb  14.4 


 


Hct  42.5 


 


MCV  98.2 


 


MCH  33.3 H 


 


MCHC  33.9 


 


RDW  12.5 


 


Plt Count  221 


 


MPV  9.5 


 


Neut # (Auto)  2.2 


 


Lymph # (Auto)  2.0 


 


Mono # (Auto)  0.4 


 


Eos # (Auto)  0.2 


 


Baso # (Auto)  0.0 


 


Absolute Nucleated RBC  0.00 


 


Nucleated RBC %  0.0 


 


Sodium   140


 


Potassium   4.0


 


Chloride   104


 


Carbon Dioxide   28


 


Anion Gap   8.0


 


BUN   15


 


Creatinine   0.8


 


Estimated GFR (MDRD)   83 L


 


Glucose   88


 


Calcium   9.7














PD MEDICAL DECISION MAKING





- ED course


ED course: 





30-year-old woman with left neck pain and mass, both clinically and 

radiographically consistent with swollen submandibular gland, sialolithiasis not

seen on CT versus infection.  She was given antibiotics noting penicillin 

allergy and a referral to ENT.





Departure





- Departure


Disposition: 01 Home, Self Care


Clinical Impression: 


 Submandibular gland infection





Condition: Good


Record reviewed to determine appropriate education?: Yes


Instructions:  ED Sublingual Gland Obstruction


Prescriptions: 


Clindamycin [Cleocin] 300 mg PO Q6H 7 Days  capsule


HYDROcod/ACETAM 5/325 [Norco 5/325] 1 - 2 tab PO Q6H PRN #15 tablet


 PRN Reason: Pain


Comments: 


As discussed, the lump is your left-sided submandibular gland on CT.  Could be 

from a stone or an infection.  We are starting some antibiotics.  If not better 

in a couple of days you should follow-up with an ENT, the closest is in 

[place], the phone number is [phone].  Return if worsening.  Also use 

lemon heads as discussed to promote salivary drainage.


Discharge Date/Time: 20 11:37

## 2020-12-14 NOTE — CT REPORT
PROCEDURE:  SOFT TISSUE NECK W

 

INDICATIONS:  L neck mass

 

CONTRAST:  IV CONTRAST: Optiray 320 ml: 100 PO CONTRAST: *NO PO CONTRAST 

 

TECHNIQUE:  

After the administration of intravenous contrast, 3.0 mm axial sections acquired from the sella to th
e aortic arch.  Additional oblique axial 3.0 mm sections acquired through the pharynx.  3 mm thick co
rashaun reformats were generated.  For radiation dose reduction, the following was used:  automated exp
osure control, adjustment of mA and/or kV according to patient size.

 

COMPARISON:  None.

 

FINDINGS:  

Image quality:  Excellent.  

 

Lymph nodes:  No enlarged lymph nodes seen throughout the neck.  Borderline enlarged lymph nodes are 
noted within the left neck, the largest measuring 9 mm in short axis.

 

Vessels:  Visualized vasculature appears patent.  

 

Neck spaces:  The oropharynx, nasopharynx, and pharynx demonstrate no mucosal lesions.  The vocal cor
ds, false vocal cords, pyriform sinuses, epiglottis, vallecula, and tongue base all appear normal.  E
xtramucosal spaces appear unremarkable.  

 

Glands:  The parotid glands appear normal. The left submandibular gland is enlarged with appearance o
f edema. Surrounding stranding is present adjacent to the gland within the subcutaneous fat. No focal
 fluid collection. No ductal dilation Adjacent lymph nodes, subcentimeter in size are noted. The thyr
oid is normal in size. 

 

Miscellaneous:  Visualized brain and orbits appear normal.  Lung apices appear clear.  Superficial so
ft tissues appear normal.

 

Bones:  No suspicious bony lesions.  Visualized sinuses and mastoids appear unremarkable.  

 

IMPRESSION: 

 

1. Enlarged and edematous appearing left submandibular gland without focal fluid collection. Overall 
appearance is most suggestive of infection/inflammation.

 

Reviewed by: Silvia Kline MD on 12/14/2020 11:02 AM PST

Approved by: Silvia Kline MD on 12/14/2020 11:02 AM PST

 

 

Station ID:  [id number]

## 2020-12-24 ENCOUNTER — HOSPITAL ENCOUNTER (EMERGENCY)
Dept: HOSPITAL 76 - ED | Age: 30
Discharge: HOME | End: 2020-12-24
Payer: SELF-PAY

## 2020-12-24 VITALS — SYSTOLIC BLOOD PRESSURE: 120 MMHG | DIASTOLIC BLOOD PRESSURE: 70 MMHG

## 2020-12-24 DIAGNOSIS — K11.3: Primary | ICD-10-CM

## 2020-12-24 DIAGNOSIS — F17.200: ICD-10-CM

## 2020-12-24 PROCEDURE — 99282 EMERGENCY DEPT VISIT SF MDM: CPT

## 2020-12-24 NOTE — ED PHYSICIAN DOCUMENTATION
PD HPI HEENT





- Stated complaint


Stated Complaint: FACIAL PX





- Chief complaint


Chief Complaint: Heent





- History obtained from


History obtained from: Patient





- History of Present Illness


Timing - onset: How many weeks ago (2)


Timing - duration: Weeks (2)


Timing - details: Abrupt onset, Still present, Waxing and waning (had improved 

initially with Clinda and Ibuprofen but not fully resolved, and now is returning

the past 2-3 days. Just finished Clinda yesterday.)


Location: Throat (feeling of pain under left side of tongue and anterior left 

neck under mandible, similar to prior ED visit 10 days ago.)


Worsens: Swalllowing, Position


Associated symptoms: Facial swelling.  No: Fever, Congestion, Headache, Cough


Recently seen: Emergency Dept (10 days ago with CT neck showing submandibular 

gland inflammation without abscess, tumor, nor obvious stone. Rx with 

hydrocodone and CLindamycin.)





Review of Systems


Constitutional: denies: Fever


Nose: denies: Rhinorrhea / runny nose, Congestion


Throat: reports: Sore throat


Respiratory: denies: Cough


GI: denies: Nausea, Vomiting, Diarrhea


Skin: denies: Rash





PD PAST MEDICAL HISTORY





- Past Medical History


Cardiovascular: None


Respiratory: None


Neuro: None


Endocrine/Autoimmune: None


GI: None


GYN: Ovarian cysts, Ovarian cancer


: None


HEENT: Chronic hearing loss


Psych: Depression, Anxiety


Musculoskeletal: Other


Derm: None





- Past Surgical History


Past Surgical History: Yes


General: Appendectomy, Other


/GYN:  section, Oophrectomy





- Present Medications


Home Medications: 


                                Ambulatory Orders











 Medication  Instructions  Recorded  Confirmed


 


Clindamycin [Cleocin] 300 mg PO Q6H 7 Days  capsule 20


 


Ciprofloxacin [Cipro] 250 mg PO Q12H #20 tablet 20 


 


HYDROcod/ACETAM 5/325 [Norco 5/325] 1 ea PO Q6H PRN #18 tablet 20 














- Allergies


Allergies/Adverse Reactions: 


                                    Allergies











Allergy/AdvReac Type Severity Reaction Status Date / Time


 


adhesive Allergy  Rash Verified 20 03:30


 


Penicillins Allergy  Hives Verified 20 03:30


 


vancomycin Allergy  Rash Verified 20 03:30


 


ketorolac [From Toradol] AdvReac  Headache Verified 20 03:30


 


latex AdvReac  Rash Verified 20 03:30














- Social History


Does the pt smoke?: Yes


Smoking Status: Current every day smoker


Does the pt drink ETOH?: No


Does the pt have substance abuse?: No





- Immunizations


Immunizations are current?: Yes





- POLST


Patient has POLST: No





PD ED PE NORMAL





- Vitals


Vital signs reviewed: Yes





- General


General: Alert and oriented X 3, Well developed/nourished, Other (normal 

phonation)





- HEENT


HEENT: PERRL, EOMI, Moist mucous membranes, Pharynx benign, Other (there is mild

left submandibular local swelling, with moderate tenderness. The left sublingual

area is also tender with minimal feeling of swelling. )





- Neck


Neck: Supple, no meningeal sign, No adenopathy





- Cardiac


Cardiac: RRR, No murmur





- Respiratory


Respiratory: Clear bilaterally





- Derm


Derm: Normal color, Warm and dry, No rash





- Neuro


Neuro: Alert and oriented X 3, No motor deficit, Normal speech





Results





- Vitals


Vitals: 


                               Vital Signs - 24 hr











  20





  03:27


 


Temperature 36.7 C


 


Heart Rate 102 H


 


Respiratory 16





Rate 


 


Blood Pressure 115/90 H


 


O2 Saturation 98








                                     Oxygen











O2 Source                      Room air

















PD MEDICAL DECISION MAKING





- ED course


Complexity details: reviewed old records, considered differential (UTD suggests 

Clinda for PCN allergic, and then consider Cipro and possible Flagyl. ), d/w 

patient


ED course: 





I did not see need for repeat imaging at this point, as does not feeling like 

abscess, and with CT 10 days ago, we know not tumors/obvious stone, other 

process. Finding of gland inflammation. 





Departure





- Departure


Disposition: 01 Home, Self Care


Clinical Impression: 


 Submandibular gland infection





Condition: Stable


Record reviewed to determine appropriate education?: Yes


Prescriptions: 


Ciprofloxacin [Cipro] 250 mg PO Q12H #20 tablet


HYDROcod/ACETAM 5/325 [Norco 5/325] 1 ea PO Q6H PRN #18 tablet


 PRN Reason: Pain


Comments: 


Stay well-hydrated.  Continue with some anti-inflammatories such as ibuprofen 2 

tablets twice daily.  Add Tylenol or hydrocodone as needed for pain.





We will try a different antibiotic ciprofloxacin twice daily as directed and see

if that works more consistently and ultimately gets rid of the infection.





Follow-up with your primary care if not improved over the next several days to a

week.

## 2021-02-05 ENCOUNTER — HOSPITAL ENCOUNTER (EMERGENCY)
Dept: HOSPITAL 76 - ED | Age: 31
Discharge: HOME | End: 2021-02-05
Payer: SELF-PAY

## 2021-02-05 VITALS — DIASTOLIC BLOOD PRESSURE: 73 MMHG | SYSTOLIC BLOOD PRESSURE: 113 MMHG
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
## 2021-02-05 DIAGNOSIS — F17.200: ICD-10-CM

## 2021-02-05 DIAGNOSIS — S19.9XXA: Primary | ICD-10-CM

## 2021-02-05 PROCEDURE — 99284 EMERGENCY DEPT VISIT MOD MDM: CPT

## 2021-02-05 PROCEDURE — 72125 CT NECK SPINE W/O DYE: CPT

## 2021-02-05 NOTE — CT REPORT
PROCEDURE:  CERVICAL SPINE WO

 

INDICATIONS:  neck injury

 

TECHNIQUE:  

Noncontrast 3 mm thick sections acquired from the skull base to the T4 level.  Sagittal and coronal r
eformats were then constructed.  For radiation dose reduction, the following was used:  automated exp
osure control, adjustment of mA and/or kV according to patient size.

 

COMPARISON:  None.

 

FINDINGS:  

Image quality:  Excellent.  

 

Bones:  No fractures or dislocations.  Visualized superior ribs are intact.  There is overall straigh
tening of normal cervical curvature. Minimal disc space narrowing is present at C6-7. Trace partially
 bridging anterior osteophytes are noted at C6-7.

 

Soft tissues:  Prevertebral soft tissues are normal in thickness.  No paravertebral hematomas.  No ap
ical pneumothoraces.  

 

IMPRESSION:  

 

1. No visualized fracture or dislocation.

 

Reviewed by: Silvia Kline MD on 2/5/2021 4:36 PM PST

Approved by: Silvia Kline MD on 2/5/2021 4:36 PM PST

 

 

Station ID:  [id number]

## 2021-02-05 NOTE — ED PHYSICIAN DOCUMENTATION
PD HPI BACK PAIN





- Stated complaint


Stated Complaint: NECK & BACK PX





- Chief complaint


Chief Complaint: Back Pain





- History obtained from


History obtained from: Patient





- Additional information


Additional information: 





About a week ago her 8-year-old autistic son did a "neck breaker" maneuver on 

her where he twisted her neck forcefully she felt some popping in her neck and 

has since then had severe neck pain especially on the left side radiating down 

to the left shoulder with some numbness in the left arm.  She also has some low 

back pain.  Denies weakness, numbness, tingling, saddle anesthesia of the lower 

extremities.  No fevers.  Note made of relatively frequent emergency department 

use, 12 visits in the last year split between [facility] and [facility].  No 

possibility of pregnancy as she has had a bilateral oophorectomy.





Review of Systems


Constitutional: denies: Fever, Chills


Nose: denies: Rhinorrhea / runny nose


Throat: denies: Sore throat


Cardiac: denies: Chest pain / pressure, Palpitations


Respiratory: denies: Dyspnea, Cough





PD PAST MEDICAL HISTORY





- Past Medical History


Cardiovascular: None


Respiratory: None


Neuro: None


Endocrine/Autoimmune: None


GI: None


GYN: Ovarian cysts, Ovarian cancer


: None


HEENT: Chronic hearing loss


Psych: Depression, Anxiety


Musculoskeletal: Other


Derm: None





- Past Surgical History


Past Surgical History: Yes


General: Appendectomy, Other


/GYN:  section, Oophrectomy





- Present Medications


Home Medications: 


                                Ambulatory Orders











 Medication  Instructions  Recorded  Confirmed


 


Oxycodone HCl/Acetaminophen 1 - 2 each PO Q6H PRN #10 tab 21 





[Percocet 5-325 mg Tablet]   


 


predniSONE [Deltasone] 20 mg PO [id number] #21 tab 21 














- Allergies


Allergies/Adverse Reactions: 


                                    Allergies











Allergy/AdvReac Type Severity Reaction Status Date / Time


 


adhesive Allergy  Rash Verified 21 16:09


 


Penicillins Allergy  Hives Verified 21 16:09


 


vancomycin Allergy  Rash Verified 21 16:09


 


ketorolac [From Toradol] AdvReac  Headache Verified 21 16:09


 


latex AdvReac  Rash Verified 21 16:09














- Social History


Does the pt smoke?: Yes


Smoking Status: Current every day smoker


Does the pt drink ETOH?: No


Does the pt have substance abuse?: No





- Immunizations


Immunizations are current?: Yes





- POLST


Patient has POLST: No





PD ED PE NORMAL





- Vitals


Vital signs reviewed: Yes





- General


General: Alert and oriented X 3, No acute distress





- HEENT


HEENT: PERRL, EOMI





- Neck


Neck: Other (Mild midline neck tenderness especially high up with some 

sternocleidomastoid and diffuse muscular shoulder tenderness on the left.)





- Cardiac


Cardiac: RRR, No murmur





- Respiratory


Respiratory: No respiratory distress, Clear bilaterally





- Abdomen


Abdomen: Soft, Non tender





- Back


Back: Other (No lumbar spinal tenderness, The patient has equal and normal 

Achilles and patellar reflexes bilaterally.  Normal sensation in all areas of 

the legs.  Patient denies saddle anesthesia.  Normal strength in flexion-

extension at the ankles, knees, and flexion of the hips.)





- Extremities


Extremities: Other (Normal and equal bilateral  strength, thumb extension, 

and interosseous strength.  She has slightly diminished sensation throughout the

left arm not in a dermatomal pattern.  Normal radial pulses.)





- Neuro


Neuro: Alert and oriented X 3, Normal speech





Results





- Vitals


Vitals: 


                               Vital Signs - 24 hr











  21





  15:54


 


Temperature 36.2 C L


 


Heart Rate 99


 


Respiratory 16





Rate 


 


Blood Pressure 113/73


 


O2 Saturation 100








                                     Oxygen











O2 Source                      Room air

















- Rads (name of study)


  ** CT Cspine


Radiology: EMP read contemporaneously (normal)





Departure





- Departure


Disposition: 01 Home, Self Care


Clinical Impression: 


 Nerve disorder involving shoulder pain





Injury of neck


Qualifiers:


 Encounter type: initial encounter Qualified Code(s): S19.9XXA - Unspecified 

injury of neck, initial encounter





Condition: Good


Record reviewed to determine appropriate education?: Yes


Instructions:  ED Neck Back Pain General


Prescriptions: 


predniSONE [Deltasone] 20 mg PO [id number] #21 tab


Oxycodone HCl/Acetaminophen [Percocet 5-325 mg Tablet] 1 - 2 each PO Q6H PRN #10

tab


 PRN Reason: pain


Comments: 


CAT scan of your neck was normal, this does not mean that there is no injury, 

just that nothing is broken.  The steroids and pain medication should help with 

the pain.  You can also take ibuprofen in addition to this.  Follow-up with your

doctor, next available appointment.  Returning if worsening.





Do not drink or drive while taking narcotic pain medication.


Note that many narcotic pain relievers also contain Tylenol/acetaminophen.  

Please ensure that your total dose of acetaminophen from all sources does not 

exceed 3 g (3000 mg) per day.


You may get constipated while on this medication.  Take a stool softener such as

Colace twice a day while you are on it.  Also add an over-the-counter laxative 

such as senna or MiraLAX on any day that you do not have a bowel movement.


If you received a narcotic pain medication or sedative while in the emergency 

department, do not drive for the next 24 hours.

## 2021-03-08 ENCOUNTER — HOSPITAL ENCOUNTER (EMERGENCY)
Dept: HOSPITAL 76 - ED | Age: 31
Discharge: HOME | End: 2021-03-08
Payer: SELF-PAY

## 2021-03-08 VITALS — SYSTOLIC BLOOD PRESSURE: 138 MMHG | DIASTOLIC BLOOD PRESSURE: 97 MMHG

## 2021-03-08 DIAGNOSIS — M54.2: Primary | ICD-10-CM

## 2021-03-08 DIAGNOSIS — M54.6: ICD-10-CM

## 2021-03-08 DIAGNOSIS — F17.200: ICD-10-CM

## 2021-03-08 PROCEDURE — 99282 EMERGENCY DEPT VISIT SF MDM: CPT

## 2021-03-08 PROCEDURE — 99283 EMERGENCY DEPT VISIT LOW MDM: CPT

## 2021-03-08 NOTE — ED PHYSICIAN DOCUMENTATION
History of Present Illness





- Stated complaint


Stated Complaint: SOA/SPASMS





- Chief complaint


Chief Complaint: General





- History obtained from


History obtained from: Patient





- Additonal information


Additional information: 





This is a 31-year-old woman with chronic hearing loss, depression, anxiety, and 

relatively frequent emergency department use for pain issues who presents with 3

days of atraumatic left-sided neck pain which today during the shower locked up 

the muscles in her left back.  There is no associated fevers, URI symptoms.  No 

nausea or vomiting.  No headache.





Review of Systems


Constitutional: denies: Fever, Chills


Ears: reports: Reviewed and negative


Nose: reports: Reviewed and negative


Throat: reports: Reviewed and negative


Cardiac: reports: Reviewed and negative


Respiratory: reports: Reviewed and negative





PD PAST MEDICAL HISTORY





- Past Medical History


Cardiovascular: None


Respiratory: None


Neuro: None


Endocrine/Autoimmune: None


GI: None


GYN: Ovarian cysts, Ovarian cancer


: None


HEENT: Chronic hearing loss


Psych: Depression, Anxiety


Musculoskeletal: Other


Derm: None





- Past Surgical History


Past Surgical History: Yes


General: Appendectomy, Other


/GYN:  section, Oophrectomy





- Present Medications


Home Medications: 


                                Ambulatory Orders











 Medication  Instructions  Recorded  Confirmed


 


Oxycodone HCl/Acetaminophen 1 - 2 each PO Q6H PRN #10 tab 21 





[Percocet 5-325 mg Tablet]   


 


predniSONE [Deltasone] 20 mg PO [id number] #21 tab 21 


 


Cyclobenzaprine [Flexeril] 10 mg PO TID PRN #20 tablet 21 


 


Meloxicam [Mobic] 7.5 mg PO BID PRN #20 tablet 21 














- Allergies


Allergies/Adverse Reactions: 


                                    Allergies











Allergy/AdvReac Type Severity Reaction Status Date / Time


 


adhesive Allergy  Rash Verified 21 12:11


 


Penicillins Allergy  Hives Verified 21 12:11


 


vancomycin Allergy  Rash Verified 21 12:11


 


ketorolac [From Toradol] AdvReac  Headache Verified 21 12:11


 


latex AdvReac  Rash Verified 21 12:11














- Social History


Does the pt smoke?: Yes


Smoking Status: Current every day smoker


Does the pt drink ETOH?: No


Does the pt have substance abuse?: Yes


Substance Use and Type: Marijuana





- Immunizations


Immunizations are current?: Yes





- POLST


Patient has POLST: No





PD ED PE NORMAL





- Vitals


Vital signs reviewed: Yes





- General


General: Alert and oriented X 3, No acute distress, Well developed/nourished





- HEENT


HEENT: PERRL, EOMI





- Neck


Neck: No bony TTP, Other (Muscular tenderness of the left sternocleidomastoid 

and left trapezius muscles)





- Respiratory


Respiratory: Clear bilaterally





- Extremities


Extremities: Other (She has patchy areas of decreased sensation in both upper 

extremities not consistent with any dermatomal areas and relatively hard for her

to ascertain.  Strength throughout the upper extremities is symmetric.)





- Neuro


Neuro: Alert and oriented X 3, CNs 2-12 intact, Normal speech





Results





- Vitals


Vitals: 


                               Vital Signs - 24 hr











  21





  11:59


 


Temperature 36.7 C


 


Heart Rate 100


 


Respiratory 16





Rate 


 


Blood Pressure 138/97 H


 


O2 Saturation 100








                                     Oxygen











O2 Source                      Room air

















PD MEDICAL DECISION MAKING





- ED course


ED course: 





31-year-old woman presents with neck muscle spasm which now involves the back.  

Less likely would be cervical radiculopathy.  She is treated with NSAIDs and 

muscle relaxers.


Offered a shot of Toradol here.  She is not actually allergic to it but causes 

her side effects which caused her to decline the medication.





Departure





- Departure


Disposition: 01 Home, Self Care


Clinical Impression: 


 Neck pain





Back pain


Qualifiers:


 Back pain location: thoracic back pain Chronicity: acute Back pain laterality: 

left Qualified Code(s): M54.6 - Pain in thoracic spine





Condition: Good


Record reviewed to determine appropriate education?: Yes


Instructions:  ED Neck Pain No Trauma


Prescriptions: 


Cyclobenzaprine [Flexeril] 10 mg PO TID PRN #20 tablet


 PRN Reason: Spasms


Meloxicam [Mobic] 7.5 mg PO BID PRN #20 tablet


 PRN Reason: Pain


Comments: 


As discussed, it does not seem like anything immediately dangerous is going on, 

seems like muscle spasm in your neck and back.  It is possible that it could be 

a pinched nerve in your neck.  Use the anti-inflammatories and muscle relaxers. 

 Do not drink or drive with muscle relaxers.  Follow-up with your primary care 

physician for consideration for physical therapy, and if that is unhelpful, then

 consideration of MRI of your neck.  Return if worsening or if you develop a 

fever.


Forms:  Activity restrictions

## 2021-04-16 ENCOUNTER — HOSPITAL ENCOUNTER (EMERGENCY)
Dept: HOSPITAL 76 - ED | Age: 31
Discharge: HOME | End: 2021-04-16
Payer: COMMERCIAL

## 2021-04-16 VITALS — DIASTOLIC BLOOD PRESSURE: 56 MMHG | SYSTOLIC BLOOD PRESSURE: 95 MMHG

## 2021-04-16 DIAGNOSIS — S60.221A: Primary | ICD-10-CM
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
## 2021-04-16 DIAGNOSIS — F17.200: ICD-10-CM

## 2021-04-16 PROCEDURE — 73130 X-RAY EXAM OF HAND: CPT

## 2021-04-16 PROCEDURE — 99283 EMERGENCY DEPT VISIT LOW MDM: CPT

## 2021-04-16 PROCEDURE — 73110 X-RAY EXAM OF WRIST: CPT

## 2021-04-16 PROCEDURE — 1040M: CPT

## 2021-04-16 NOTE — XRAY REPORT
PROCEDURE:  Hand 3 View RT

 

INDICATIONS:  Trauma

 

TECHNIQUE:  3 views of the hand(s) acquired.  

 

COMPARISON:  None

 

FINDINGS:  

 

Bones:  No fractures or dislocations.  No suspicious bony lesions.  

 

Soft tissues:  No suspicious soft tissue calcifications.  

 

IMPRESSION:  

No trauma found.

 

Reviewed by: Librado Guevara MD on 4/16/2021 5:33 PM PDT

Approved by: Librado Guevara MD on 4/16/2021 5:33 PM PDT

 

 

Station ID:  [id number]

## 2021-04-16 NOTE — EXTERNAL MEDICAL SUMMARY RPT
Continuity of Care Document

                            Created on:2021



Patient:Yanique Barba

Sex:Female

:1990

External Reference #:[id number]





Demographics







                          Phone                     Unavailable

 

                          Preferred Language        English

 

                          Marital Status            Unknown

 

                          Sabianism Affiliation     Unknown

 

                          Race                      Unknown

 

                          Ethnic Group              Unknown









Author







                          Organization              [facility]

 

                          Address                    [place] [id number]

 

                          Phone                     [phone]









Support







                Name            Relationship    Address         Phone

 

                Jameson         Unavailable     Unavailable     Unavailable









Care Team Providers







                    Name                Role                Phone

 

                    Rookstool           Unavailable         Unavailable









Medications







                     date                description         facility

 

                     2021            Metoclopramide 10 MG Oral Tablet  [facility]







Procedures







                     date                description         facility

 

                     2021            [facility]







Vital Signs







                 date            measurement     value           source

 

                 2021        respiration_rate  20              /min

 

                 2021        temperature_metric  36.39           C

 

                 2021        temperature_standard  97.5            F

 

                 2021        weight_metric   22.63           kg

 

                 2021        weight_standard  49.9            lb









                 date            measurement     value           source

 

                 [id number]        BP_diastolic    60              mm[Hg]

 

                 [id number]        BP_systolic     101             mm[Hg]

 

                 [id number]        heart_rate      63              /min







Social History







                     date                description         facility

 

                     [id number]

## 2021-04-16 NOTE — ED PHYSICIAN DOCUMENTATION
PD HPI UPPER EXT INJURY





- Stated complaint


Stated Complaint: RT ARM PX/SWELLING





- Chief complaint


Chief Complaint: Trauma Ext





- History obtained from


History obtained from: Patient





- History of Present Illness


Location: Right, Wrist, Hand


Type of injury: Blunt / blow (back of hand accidentally struck, with swelling 

and pain that has continued.)


Where injury occurred: Work


Timing - onset: How many days ago (3)


Timing - duration: Days (4)


Timing - details: Abrupt onset, Still present


Worsened by: Moving, Palpating


Associated symptoms: Swelling.  No: Weakness, Numbness


Similar symptoms before: Has not had sx before





Review of Systems


Constitutional: denies: Fever, Chills


Nose: denies: Rhinorrhea / runny nose, Congestion


Throat: denies: Sore throat


Respiratory: denies: Cough


Neurologic: denies: Focal weakness, Numbness





PD PAST MEDICAL HISTORY





- Past Medical History


Cardiovascular: None


Respiratory: None


Neuro: None


Endocrine/Autoimmune: None


GI: None


GYN: Ovarian cysts, Ovarian cancer


: None


HEENT: Chronic hearing loss


Psych: Depression, Anxiety


Musculoskeletal: Other


Derm: None





- Past Surgical History


Past Surgical History: Yes


General: Appendectomy, Other


/GYN:  section, Oophrectomy





- Present Medications


Home Medications: 


                                Ambulatory Orders











 Medication  Instructions  Recorded  Confirmed


 


HYDROcod/ACETAM 5/325 [Norco 5/325] 1 ea PO Q6H PRN #14 tablet 21 














- Allergies


Allergies/Adverse Reactions: 


                                    Allergies











Allergy/AdvReac Type Severity Reaction Status Date / Time


 


adhesive Allergy  Rash Verified 21 16:55


 


Penicillins Allergy  Hives Verified 21 16:55


 


vancomycin Allergy  Rash Verified 21 16:55


 


ketorolac [From Toradol] AdvReac  Headache Verified 21 16:55


 


latex AdvReac  Rash Verified 21 16:55














- Social History


Does the pt smoke?: Yes


Smoking Status: Current every day smoker


Does the pt drink ETOH?: No


Does the pt have substance abuse?: Yes





- Immunizations


Immunizations are current?: Yes





- POLST


Patient has POLST: No





PD ED PE NORMAL





- Vitals


Vital signs reviewed: Yes





- General


General: Alert and oriented X 3, Well developed/nourished, Other (appears 

uncomfortable and is holding right hand and wrist up guardedly in front of her 

chest. )





- Derm


Derm: Normal color, Warm and dry





- Extremities


Extremities: Other (Right dorsum hand and wrist with some local swelling and 

tenderness but no deformity. Guarded ROM but can .)





- Neuro


Neuro: Alert and oriented X 3, No motor deficit, No sensory deficit





Results





- Vitals


Vitals: 


                                     Oxygen











O2 Source                      Room air

















- Rads (name of study)


  ** right hand/wrist


Radiology: Prelim report reviewed (no fractures), See rad report





PD MEDICAL DECISION MAKING





- ED course


Complexity details: reviewed results (no fractures), considered differential 

(very tender with swelling. Contusion vs fracture. Can get xray and provide 

splint. ), d/w patient





Departure





- Departure


Disposition: 01 Home, Self Care


Clinical Impression: 


Hand contusion


Qualifiers:


 Encounter type: initial encounter Laterality: right Qualified Code(s): S60.221A

- Contusion of right hand, initial encounter





Condition: Stable


Record reviewed to determine appropriate education?: Yes


Instructions:  ED Sprain Hand


Follow-Up: 


Bairon Benson MD [Provider Admit Priv/Credential] - 


Prescriptions: 


HYDROcod/ACETAM 5/325 [Norco 5/325] 1 ea PO Q6H PRN #14 tablet


 PRN Reason: Pain


Comments: 


Doctors seen on your x-ray.  However is obviously still hurting.  Presume some 

bruising within the hand structures and possible sprain of some of the tendons.





Use the hand brace for the next week or so.  Light use only with the right hand.





Continue some anti-inflammatory such as ibuprofen 2-3 times a day with food.  To

that add Tylenol or hydrocodone if needed for pain.





I would anticipate improvement over the next several days and resolved by 5 or 6

days.  Follow-up with orthopedics if not improved in that timeframe.


Forms:  Activity restrictions


Discharge Date/Time: 21 18:14

## 2021-04-16 NOTE — XRAY REPORT
PROCEDURE:  Wrist 4 View RT

 

INDICATIONS: Trauma

 

TECHNIQUE:  4 views of the wrist were acquired.  

 

COMPARISON:  Hand plain film same day.

 

FINDINGS:  

 

Bones:  No fractures or dislocations.  No suspicious bony lesions.  

 

Scaphoid view:  No trauma to the scaphoid is found.

 

Soft tissues:  No suspicious soft tissue calcifications.  

 

IMPRESSION:  

No trauma found, please note that delayed plain films in several days if unusual symptoms persist may
 be warranted to assist in detecting nondisplaced fractures obscured by overlapping bone structures a
t the wrist.

 

Reviewed by: Librado Guevara MD on 4/16/2021 5:34 PM PDT

Approved by: Librado Guevara MD on 4/16/2021 5:34 PM PDT

 

 

Station ID:  [id number]

## 2021-04-16 NOTE — EXTERNAL MEDICAL SUMMARY RPT
Continuity of Care Document

                            Created on:2021



Patient:Yanique Barba

Sex:Female

:1990

External Reference #:[id number]





Demographics







                          Phone                     Unavailable

 

                          Preferred Language        English

 

                          Marital Status            Unknown

 

                          Baptism Affiliation     Unknown

 

                          Race                      Unknown

 

                          Ethnic Group              Unknown









Author







                          Organization              [facility]

 

                          Address                    [place] [address]

 

                          Phone                     [phone]









Support







                Name            Relationship    Address         Phone

 

                Jameson         Unavailable     Unavailable     Unavailable









Care Team Providers







                    Name                Role                Phone

 

                    Rookstool           Unavailable         Unavailable









Medications







                     date                description         facility

 

                     2021            Metoclopramide 10 MG Oral Tablet  [facility]







Procedures







                     date                description         facility

 

                     2021            [facility]







Vital Signs







                 date            measurement     value           source

 

                 2021        respiration_rate  20              /min

 

                 2021        temperature_metric  36.39           C

 

                 2021        temperature_standard  97.5            F

 

                 2021        weight_metric   22.63           kg

 

                 2021        weight_standard  49.9            lb









                 date            measurement     value           source

 

                 [id number]        BP_diastolic    60              mm[Hg]

 

                 [id number]        BP_systolic     101             mm[Hg]

 

                 [id number]        heart_rate      63              /min







Social History







                     date                description         facility

 

                     [id number]

## 2021-07-12 ENCOUNTER — HOSPITAL ENCOUNTER (EMERGENCY)
Dept: HOSPITAL 76 - ED | Age: 31
Discharge: HOME | End: 2021-07-12
Payer: SELF-PAY

## 2021-07-12 VITALS — SYSTOLIC BLOOD PRESSURE: 111 MMHG | DIASTOLIC BLOOD PRESSURE: 75 MMHG

## 2021-07-12 DIAGNOSIS — F17.200: ICD-10-CM

## 2021-07-12 DIAGNOSIS — S93.601A: Primary | ICD-10-CM

## 2021-07-12 DIAGNOSIS — S93.401A: ICD-10-CM
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
## 2021-07-12 PROCEDURE — 73610 X-RAY EXAM OF ANKLE: CPT

## 2021-07-12 PROCEDURE — 99283 EMERGENCY DEPT VISIT LOW MDM: CPT

## 2021-07-12 PROCEDURE — 99284 EMERGENCY DEPT VISIT MOD MDM: CPT

## 2021-07-12 PROCEDURE — 73630 X-RAY EXAM OF FOOT: CPT

## 2021-07-12 RX ADMIN — HYDROCODONE BITARTRATE AND ACETAMINOPHEN STA TAB: 5; 325 TABLET ORAL at 13:57

## 2021-07-12 NOTE — ED PHYSICIAN DOCUMENTATION
PD HPI LOWER EXT INJURY





- Stated complaint


Stated Complaint: RT FOOT INJURY





- Chief complaint


Chief Complaint: Trauma Ext





- History obtained from


History obtained from: Patient





- History of Present Illness


PD HPI LOW EXT INJURY LOCATION: Right, Ankle, Foot


Type of injury: Fall, Twist


Where injury occurred: Home


Timing - details: Abrupt onset


Pain level max: 10


Pain level now: 10


Worsened by: Moving


Contributing factors: No: Anticoagulated, Prior ortho surgery





- Additional information


Additional information: 





Patient is a 31-year-old female who states that she jumped off the deck today 

and landed on her right foot and ankle, twisting the 2.  She states instant 

pain.  She states she is now having numbness and pain in her foot and ankle.  

Nothing makes it better, worse with movement and trying to walk.





Review of Systems


Constitutional: denies: Fever, Chills


Respiratory: denies: Cough


GI: denies: Nausea, Vomiting


Musculoskeletal: denies: Neck pain, Back pain


Neurologic: denies: Headache, Head injury, LOC





PD PAST MEDICAL HISTORY





- Past Medical History


Cardiovascular: None


Respiratory: None


Neuro: None


Endocrine/Autoimmune: None


GI: None


GYN: Ovarian cysts, Ovarian cancer


: None


HEENT: Chronic hearing loss


Psych: Depression, Anxiety


Musculoskeletal: Other


Derm: None





- Past Surgical History


Past Surgical History: Yes


General: Appendectomy, Other


/GYN:  section, Oophrectomy





- Present Medications


Home Medications: 


                                Ambulatory Orders











 Medication  Instructions  Recorded  Confirmed


 


HYDROcod/ACETAM 5/325 [Norco 5/325] 1 ea PO Q6H PRN #14 tablet 21 


 


HYDROcod/ACETAM 5/325 [Norco 5/325] 1 - 2 ea PO Q6H PRN #14 tablet 21 














- Allergies


Allergies/Adverse Reactions: 


                                    Allergies











Allergy/AdvReac Type Severity Reaction Status Date / Time


 


adhesive Allergy  Rash Verified 21 12:53


 


Penicillins Allergy  Hives Verified 21 12:53


 


vancomycin Allergy  Rash Verified 21 12:53


 


ketorolac [From Toradol] AdvReac  Headache Verified 21 12:53


 


latex AdvReac  Rash Verified 21 12:53














- Social History


Does the pt smoke?: Yes


Smoking Status: Current every day smoker


Does the pt drink ETOH?: No


Does the pt have substance abuse?: Yes





- Immunizations


Immunizations are current?: Yes





- POLST


Patient has POLST: No





PD ED PE NORMAL





- Vitals


Vital signs reviewed: Yes





- General


General: Alert and oriented X 3, No acute distress





- HEENT


HEENT: Moist mucous membranes





- Neck


Neck: Supple, no meningeal sign





- Cardiac


Cardiac: RRR





- Respiratory


Respiratory: No respiratory distress, Clear bilaterally





- Derm


Derm: Warm and dry





- Extremities


Extremities: Other (Tenderness palpation along the fifth metatarsal of the right

foot also tender on the lateral malleolus.  No gross deformity.  No swelling.  

Neurovascularly intact.  Otherwise normal exam of the right foot and ankle and 

lower leg.)





- Neuro


Neuro: Alert and oriented X 3





Results





- Vitals


Vitals: 


                               Vital Signs - 24 hr











  21





  12:48 13:54


 


Temperature 36.8 C 36.6 C


 


Heart Rate 105 H 86


 


Respiratory 18 18





Rate  


 


Blood Pressure 112/71 111/75


 


O2 Saturation 99 100








                                     Oxygen











O2 Source                      Room air

















- Rads (name of study)


  ** Right ankle x-ray


Radiology: Final report received, EMP read contemporaneously, See rad report (No

acute abnormality)





  ** Right foot x-ray


Radiology: Final report received, See rad report (No acute abnormality)





PD MEDICAL DECISION MAKING





- ED course


Complexity details: reviewed results, re-evaluated patient, considered di

fferential, d/w patient


ED course: 


31-year-old female with a right ankle and right foot sprain.  No acute findings 

on x-ray.  Given crutches.  Placed in a gel splint.  Pain well controlled.  

Neurovascular intact.  Patient counseled regarding signs and symptoms for which 

I believe and urgent re-evaluation would be necessary. Patient with good 

understanding of and agreement to plan and is comfortable going home at this 

time





This document was made in part using voice recognition software. While efforts 

are made to proofread this document, sound alike and grammatical errors may 

occur.





I am prescribing a short course of short-acting opioid pain medication for this 

patient. I have reviewed the patients  and no concerning findings were 

noted. I have discussed that the opioids are for short term therapy only, and 

will not be refilled from the ED. 





Departure





- Departure


Disposition: 01 Home, Self Care


Clinical Impression: 


Right foot sprain


Qualifiers:


 Encounter type: initial encounter Qualified Code(s): S93.601A - Unspecified 

sprain of right foot, initial encounter





Right ankle sprain


Qualifiers:


 Encounter type: initial encounter Involved ligament of ankle: unspecified 

ligament Qualified Code(s): S93.401A - Sprain of unspecified ligament of right 

ankle, initial encounter





Condition: Good


Instructions:  ED Sprain Ankle W X Ray, ED Sprain Foot


Follow-Up: 


your,doctor in 1 week [Other]


Prescriptions: 


HYDROcod/ACETAM 5/325 [Norco 5/325] 1 - 2 ea PO Q6H PRN #14 tablet


 PRN Reason: Pain


Comments: 


You may bear weight as tolerated.  Wear the splint for comfort.  Follow-up with 

your doctor in 1 week for repeat evaluation.





I am prescribing a short course of narcotic pain medication for you. These are 

potentially dangerous and addictive medications that should be used carefully. 


These medications may constipate you. Take an over-the-counter stool softener 

(docusate) twice daily with plenty of water while taking these medications. If 

you go 24 hours without a bowel movement, take over-the-counter miralax, per 

package instructions.


Do not drink or drive while taking these medications. 


If you received narcotic or sedating medications while in the emergency 

department, do not drive for 24 hours.


Store this medication in a safe, secure place and out of reach of children. 


It is a violation of federal law to give or sell this medication to another 

person or to use in a manner other than prescribed.


The ED will not refill narcotic prescriptions, including prescriptions lost or 

stolen.


To dispose of unwanted medications:


1. [facility] at [address] in 

[place] has a medication drop box. They accept prescription medications (in 

pill form) Monday through Friday 9:00 a.m. to 5:00 p.m. 


2. [facility] [employer] accepts prescription medications (in

pill form only) for disposal year round. Call [phone] for more 

information. 


3. Contact the [facility] for the next [facility] sponsored prescription d

rug collection event. [phone], [phone], or [phone] 

[phone];

## 2021-07-12 NOTE — XRAY REPORT
PROCEDURE:  Ankle 3 View RT

 

INDICATIONS:  Trauma

 

TECHNIQUE:  3 views of the ankle were acquired.  

 

COMPARISON:  None

 

FINDINGS:  

 

Bones:  No fractures or dislocations.  Ankle mortise is normally aligned.  No suspicious bony lesions
.  

 

Soft tissues:  No tibiotalar joint effusion.  Achilles tendon appears normal.  

 

IMPRESSION:  No acute finding.

 

Reviewed by: Jeffrey Schuster MD on 7/12/2021 1:27 PM PDT

Approved by: Jeffrey Schuster MD on 7/12/2021 1:27 PM PDT

 

 

Station ID:  [id number]

## 2021-07-12 NOTE — XRAY REPORT
PROCEDURE:  Foot 3 View RT

 

INDICATIONS:  Trauma

 

TECHNIQUE:  3 views of the foot were acquired.  

 

COMPARISON:  None

 

FINDINGS:  

 

Bones:  No fractures or dislocations.  No suspicious bony lesions.  

 

Soft tissues:  No tibiotalar joint effusion.  Achilles tendon appears normal.  

 

IMPRESSION:  

No acute finding.

 

Reviewed by: Jeffrey Schuster MD on 7/12/2021 1:28 PM PDT

Approved by: Jeffrey Schuster MD on 7/12/2021 1:28 PM PDT

 

 

Station ID:  [id number]

## 2021-07-22 ENCOUNTER — HOSPITAL ENCOUNTER (EMERGENCY)
Dept: HOSPITAL 76 - ED | Age: 31
LOS: 1 days | Discharge: HOME | End: 2021-07-23
Payer: SELF-PAY

## 2021-07-22 VITALS — SYSTOLIC BLOOD PRESSURE: 106 MMHG | DIASTOLIC BLOOD PRESSURE: 69 MMHG

## 2021-07-22 DIAGNOSIS — F17.200: ICD-10-CM
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
## 2021-07-22 DIAGNOSIS — S60.474A: ICD-10-CM

## 2021-07-22 DIAGNOSIS — S90.31XA: Primary | ICD-10-CM
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
## 2021-07-22 PROCEDURE — 99284 EMERGENCY DEPT VISIT MOD MDM: CPT

## 2021-07-22 PROCEDURE — 99283 EMERGENCY DEPT VISIT LOW MDM: CPT

## 2021-07-22 NOTE — ED PHYSICIAN DOCUMENTATION
PD HPI HEAD INJURY





- Stated complaint


Stated Complaint: R HAND DOG BITE





- Chief complaint


Chief Complaint: Laceration





- History obtained from


History obtained from: Patient





- Additional information


Additional information: 


Patient comes emergency department chief complaint of dog bite and right foot 

pain.  She states she was playing with her pitbull mix tonight when he got 

excited and ended up nipping her hand.  She sustained a superficial wound to her

dorsal right ring finger and dorsal right middle finger.  No limitation in 

movement.  No other bite wounds.  Patient states she talked to her friend who is

a  and was told by the  that patient should come to the 

ED "right away" because "dog bites can get infected really quickly".  Patient 

does note that she washed the wound very well when this first happened at 1430 

this afternoon.  Patient states that while she was involved with the dog, she 

reinjured her right foot, which has been causing her pain, due to a contusion on

the plantar aspect That she sustained a week ago.  She states it feels "rock 

hard" and that even keeping weight off and using crutches, her pain is still 

very bad.  No other complaints at this time.








Review of Systems


Ten Systems: 10 systems reviewed and negative


Constitutional: reports: Reviewed and negative


Eyes: reports: Reviewed and negative


Ears: reports: Reviewed and negative


Nose: reports: Reviewed and negative


Throat: reports: Reviewed and negative


Cardiac: reports: Reviewed and negative


Respiratory: reports: Reviewed and negative


GI: reports: Reviewed and negative


: reports: Reviewed and negative


Skin: reports: Abrasion (s), Laceration (s)


Musculoskeletal: reports: Extremity pain, Pain with weight bearing


Neurologic: reports: Reviewed and negative


Psychiatric: reports: Reviewed and negative


Endocrine: reports: Reviewed and negative


Immunocompromised: reports: Reviewed and negative





PD PAST MEDICAL HISTORY





- Past Medical History


Past Medical History: Yes


Cardiovascular: None


Respiratory: None


Neuro: None


Endocrine/Autoimmune: None


GI: None


GYN: Ovarian cysts, Ovarian cancer


: None


HEENT: Chronic hearing loss


Psych: Depression, Anxiety


Musculoskeletal: Other


Derm: None





- Past Surgical History


Past Surgical History: Yes


General: Appendectomy, Other


/GYN:  section, Oophrectomy





- Present Medications


Home Medications: 


                                Ambulatory Orders











 Medication  Instructions  Recorded  Confirmed


 


Clindamycin [Cleocin] 300 mg PO Q6H 7 Days 21 














- Allergies


Allergies/Adverse Reactions: 


                                    Allergies











Allergy/AdvReac Type Severity Reaction Status Date / Time


 


adhesive Allergy  Rash Verified 21 22:49


 


Penicillins Allergy  Hives Verified 21 22:49


 


vancomycin Allergy  Rash Verified 21 22:49


 


ketorolac [From Toradol] AdvReac  Headache Verified 21 22:49


 


latex AdvReac  Rash Verified 21 22:49














- Social History


Does the pt smoke?: Yes


Smoking Status: Current every day smoker


Does the pt drink ETOH?: No


Does the pt have substance abuse?: Yes





- Immunizations


Immunizations are current?: Yes





- POLST


Patient has POLST: No





PD ED PE NORMAL





- Vitals


Vital signs reviewed: Yes





- General


General: Alert and oriented X 3, No acute distress, Well developed/nourished





- HEENT


HEENT: Atraumatic, PERRL, EOMI, Moist mucous membranes





- Neck


Neck: Supple, no meningeal sign





- Cardiac


Cardiac: Strong equal pulses





- Respiratory


Respiratory: No respiratory distress





- Derm


Derm: Warm and dry, Other (Right foot plantar contusion as noted above.  Very 

superficial laceration involving skin thickness only over right ring finger PIP 

dorsally.  Even with firm pressure unable to pry laceration apart any further 

then skin thickness.  Tiny superficial skin puncture dorsal R mid finger over 

prox phalanx)





- Extremities


Extremities: No deformity, Other (Contusion with mild edema Medial plantar 

aspect of midfoot on the right.  No bony tenderness.  Minor limitation of range 

of motion. No deformity of right fingers.)





- Neuro


Neuro: Alert and oriented X 3, No motor deficit, No sensory deficit





- Psych


Psych: Normal mood, Normal affect





Results





- Vitals


Vitals: 


                               Vital Signs - 24 hr











  21





  22:49


 


Temperature 36.5 C


 


Heart Rate 100


 


Respiratory 16





Rate 


 


Blood Pressure 106/69


 


O2 Saturation 100








                                     Oxygen











O2 Source                      Room air

















- Rads (name of study)


  ** Right foot x-ray


Radiology: Final report received, EMP read indepedently, See rad report 

(Negative)





PD MEDICAL DECISION MAKING





- ED course


Complexity details: reviewed results, re-evaluated patient, considered 

differential, d/w patient


ED course: 





The patient came in stating that her  friends had told her that she 

should get to the ER right away because "dog bites can get infected really 

quickly."  I discussed with the patient that actually, when proper washing is 

undertaken soon after the dog bite, the dog bites do not commonly get infected. 

We have cleaned the patient's wounds, which do not require suture repair.  I 

have given the patient a prescription in case she notices any signs of infection

but I have advised her that I would not at all fill this prescription unless she

begins to notice redness and swelling spreading away from the wound.  As to the 

patient's foot, the patient appears to have a contusion but I have obtained an 

x-ray series which is also negative.  The patient has requested pain medication 

for at home since she says ibuprofen and Tylenol are not working.  I have 

declined to give her any narcotic pain medication for her foot contusion, as she

already has crutches and should be elevating to help with this.  We have 

discussed home management of the symptoms, as well as the usual indications for 

return.





Departure





- Departure


Disposition: 01 Home, Self Care


Clinical Impression: 


Dog bite


Qualifiers:


 Encounter type: initial encounter Qualified Code(s): W54.0XXA - Bitten by dog, 

initial encounter





Contusion of foot, right


Qualifiers:


 Encounter type: subsequent encounter Qualified Code(s): S90.31XD - Contusion of

right foot, subsequent encounter





Condition: Stable


Instructions:  ED Contusion Foot, ED Bite Dog


Prescriptions: 


Clindamycin [Cleocin] 300 mg PO Q6H 7 Days


Comments: 


Your x-rays do not show any evidence of injury to the bone tonight.  You have a 

bruise with some swelling on the bottom of your foot, but this should heal well 

on its own, given time.  Please continue to use the crutches and use ice and 

elevation to help with some of the throbbing and swelling.  Your hand and wounds

 look quite good tonight.  Dog bite injuries actually rarely become infected 

with good washing, and antibiotics generally are not given as a precaution for 

dog bites.  This is in contrast to some other kinds of animal bites.  If you 

notice any swelling and redness spreading progressively away from your wounds, 

then you may fill the antibiotic prescription, but you should not take the 

antibiotic unless you actually develop symptoms of infection.  At this point in 

time there are none.  Your wound has not demonstrated instability or significant

 depth, despite significant pressure applied to the wound to explore its depth, 

and therefore, no suturing is indicated at this time.  Please follow-up with 

your primary care physician as needed.


Discharge Date/Time: 21 00:05

## 2021-07-23 NOTE — XRAY REPORT
PROCEDURE:  Foot 3 View RT

 

INDICATIONS:  re-injury/pain/swelling

 

TECHNIQUE:  3 views of the foot were acquired.  

 

COMPARISON:  7/12/2021

 

FINDINGS:  

 

Bones:  No fractures or dislocations.  No suspicious bony lesions.  

 

Soft tissues:  No tibiotalar joint effusion.  Achilles tendon appears normal.  

 

IMPRESSION:  

 

1. No fracture or dislocation.

 

Reviewed by: Chris Mcclellan MD on 7/23/2021 9:29 AM PDT

Approved by: Chris Mcclellan MD on 7/23/2021 9:29 AM PDT

 

 

Station ID:  [id number]

## 2021-09-29 ENCOUNTER — HOSPITAL ENCOUNTER (EMERGENCY)
Dept: HOSPITAL 76 - ED | Age: 31
Discharge: HOME | End: 2021-09-29
Payer: SELF-PAY

## 2021-09-29 VITALS — SYSTOLIC BLOOD PRESSURE: 115 MMHG | DIASTOLIC BLOOD PRESSURE: 72 MMHG

## 2021-09-29 DIAGNOSIS — K08.89: ICD-10-CM

## 2021-09-29 DIAGNOSIS — Z88.1: ICD-10-CM

## 2021-09-29 DIAGNOSIS — F17.200: ICD-10-CM

## 2021-09-29 DIAGNOSIS — Z88.0: ICD-10-CM

## 2021-09-29 DIAGNOSIS — K04.7: Primary | ICD-10-CM

## 2021-09-29 PROCEDURE — 99282 EMERGENCY DEPT VISIT SF MDM: CPT

## 2021-09-29 PROCEDURE — 99283 EMERGENCY DEPT VISIT LOW MDM: CPT

## 2021-09-29 NOTE — ED PHYSICIAN DOCUMENTATION
History of Present Illness





- Stated complaint


Stated Complaint: TOOTH/EAR PX





- Chief complaint


Chief Complaint: Heent





- History obtained from


History obtained from: Patient





- Additonal information


Additional information: 





31yF +smoker p/w chronic dental pain, worsening over the past 2 nights so she 

can't sleep. Patient is trying to get in to see a dentist. pain is R lower back 

tooth, throbbing, constant, radiating to R ear, a/w tinnitus. denies fever, 

drainage, difficulty with secretions, or pain under the chin. 





Review of Systems


Constitutional: denies: Fever, Chills


Ears: reports: Ear pain


Throat: reports: Dental pain / toothache





PD PAST MEDICAL HISTORY





- Past Medical History


Past Medical History: Yes


Cardiovascular: None


Respiratory: None


Neuro: None


Endocrine/Autoimmune: None


GI: None


GYN: Ovarian cysts, Ovarian cancer


: None


HEENT: Chronic hearing loss


Psych: Depression, Anxiety


Musculoskeletal: Other


Derm: None





- Past Surgical History


Past Surgical History: Yes


General: Appendectomy, Other


/GYN:  section, Oophrectomy





- Present Medications


Home Medications: 


                                Ambulatory Orders











 Medication  Instructions  Recorded  Confirmed


 


Clindamycin [Cleocin] 450 mg PO TID 7 Days #21 tab 21 














- Allergies


Allergies/Adverse Reactions: 


                                    Allergies











Allergy/AdvReac Type Severity Reaction Status Date / Time


 


adhesive Allergy  Rash Verified 21 03:02


 


Penicillins Allergy  Hives Verified 21 03:02


 


vancomycin Allergy  Rash Verified 21 03:02


 


ketorolac [From Toradol] AdvReac  Headache Verified 21 03:02


 


latex AdvReac  Rash Verified 21 03:02














- Social History


Does the pt smoke?: Yes


Smoking Status: Current every day smoker


Does the pt drink ETOH?: No


Does the pt have substance abuse?: Yes





- Immunizations


Immunizations are current?: Yes





- POLST


Patient has POLST: No





PD ED PE NORMAL





- Vitals


Vital signs reviewed: Yes





- General


General: Alert and oriented X 3, No acute distress, Well developed/nourished





- HEENT


HEENT: Atraumatic, PERRL, EOMI, Ears normal, Moist mucous membranes, Other (poor

dentition. tooth 31 avulsed with evidence of mild infection. tolerating 

secretions. NAD. no ludwigs. nontender along jawline)





- Neck


Neck: Supple, no meningeal sign





- Derm


Derm: Normal color, Warm and dry





- Neuro


Neuro: Alert and oriented X 3





Results





- Vitals


Vitals: 





                               Vital Signs - 24 hr











  21





  02:58 03:03


 


Temperature 36.0 C L 36 C L


 


Heart Rate 102 H 102 H


 


Respiratory 16 16





Rate  


 


Blood Pressure 114/78 114/78


 


O2 Saturation 96 96








                                     Oxygen











O2 Source                      Room air

















PD MEDICAL DECISION MAKING





- ED course


ED course: 





Offered dental block but patient declined, requesting more pain medicine to take

home to help her sleep tonight until she can get in to [facility].  Return precautions

given.  Antibiotics prescribed for dental infection. 





Departure





- Departure


Disposition:  Home, Self Care


Clinical Impression: 


 Pain, dental, Dental infection





Condition: Good


Instructions:  ED Tooth Pain


Follow-Up: 


Bryn Zheng DDS [Provider Admit Priv/Credential] - 


Prescriptions: 


Clindamycin [Cleocin] 450 mg PO TID 7 Days #21 tab


Comments: 


You were seen in the emergency department for dental infection. Please follow-up

 with Dr. Bryn Zheng, oral maxillofacial surgeon.  Return to the emergency 

department if you have any new or worsening symptoms or other concerns.

## 2021-12-20 ENCOUNTER — HOSPITAL ENCOUNTER (EMERGENCY)
Dept: HOSPITAL 76 - ED | Age: 31
Discharge: HOME | End: 2021-12-20
Payer: SELF-PAY

## 2021-12-20 VITALS — SYSTOLIC BLOOD PRESSURE: 98 MMHG | DIASTOLIC BLOOD PRESSURE: 60 MMHG

## 2021-12-20 DIAGNOSIS — F17.200: ICD-10-CM

## 2021-12-20 DIAGNOSIS — M54.50: Primary | ICD-10-CM

## 2021-12-20 LAB
CLARITY UR REFRACT.AUTO: (no result)
GLUCOSE UR QL STRIP.AUTO: NEGATIVE MG/DL
KETONES UR QL STRIP.AUTO: NEGATIVE MG/DL
NITRITE UR QL STRIP.AUTO: NEGATIVE
PH UR STRIP.AUTO: 7.5 PH (ref 5–7.5)
PROT UR STRIP.AUTO-MCNC: NEGATIVE MG/DL
RBC # UR STRIP.AUTO: NEGATIVE /UL
SP GR UR STRIP.AUTO: 1.02 (ref 1–1.03)
SQUAMOUS URNS QL MICRO: (no result)
UROBILINOGEN UR QL STRIP.AUTO: (no result) E.U./DL
UROBILINOGEN UR STRIP.AUTO-MCNC: NEGATIVE MG/DL
WBC # UR MANUAL: (no result) /HPF (ref 0–5)

## 2021-12-20 PROCEDURE — 81001 URINALYSIS AUTO W/SCOPE: CPT

## 2021-12-20 PROCEDURE — 81003 URINALYSIS AUTO W/O SCOPE: CPT

## 2021-12-20 PROCEDURE — 99283 EMERGENCY DEPT VISIT LOW MDM: CPT

## 2021-12-20 PROCEDURE — 87086 URINE CULTURE/COLONY COUNT: CPT

## 2021-12-20 NOTE — ED PHYSICIAN DOCUMENTATION
PD HPI BACK PAIN





- Stated complaint


Stated Complaint: LOW BACK PX, WEAKNESS





- Chief complaint


Chief Complaint: Trauma Ch/Bk





- Additional information


Additional information: 


Patient is a 31-year-old female with past history significant for ovarian CA, 

fibromyalgia,Anxiety, depression presenting to the emergency department with 

back pain.  Patient reports right-sided low back pain that began earlier today. 

States that she "was coughing so hard after taking a huge hit that I felt a pop 

in my back".  Since that time she has had right-sided low back pain with 

reported shooting sensations down into her right leg.  Reports similar sensation

s in the past but never this severe.  Does report subjective weakness in the 

right leg but has been able to ambulate since the event.  Denied any fever, 

saddle paresthesias, loss of bowel bladder control.








Review of Systems


Ten Systems: 10 systems reviewed and negative


Constitutional: denies: Fever


: denies: Unable to Void, Incontinent


Musculoskeletal: reports: Back pain





PD PAST MEDICAL HISTORY





- Past Medical History


Past Medical History: Yes


Cardiovascular: None


Respiratory: None


Neuro: None


Endocrine/Autoimmune: None


GI: None


GYN: Ovarian cysts, Ovarian cancer


: None


HEENT: Chronic hearing loss


Psych: Depression, Anxiety


Musculoskeletal: Other


Derm: None





- Past Surgical History


Past Surgical History: Yes


General: Appendectomy, Other


/GYN:  section, Oophrectomy





- Present Medications


Home Medications: 


                                Ambulatory Orders











 Medication  Instructions  Recorded  Confirmed


 


HYDROcod/ACETAM 5/325 [Norco 5/325] 1 - 2 ea PO Q6H PRN #14 tablet 21 














- Allergies


Allergies/Adverse Reactions: 


                                    Allergies











Allergy/AdvReac Type Severity Reaction Status Date / Time


 


adhesive Allergy  Rash Verified 21 20:49


 


Penicillins Allergy  Hives Verified 21 20:49


 


vancomycin Allergy  Rash Verified 21 20:49


 


ketorolac [From Toradol] AdvReac  Headache Verified 21 20:49


 


latex AdvReac  Rash Verified 21 20:49














- Social History


Does the pt smoke?: Yes


Smoking Status: Current every day smoker


Does the pt drink ETOH?: No


Does the pt have substance abuse?: Yes





- Immunizations


Immunizations are current?: Yes





- POLST


Patient has POLST: No





PD ED PE NORMAL





- Vitals


Vital signs reviewed: Yes





- General


General: Alert and oriented X 3.  No: No acute distress





- HEENT


HEENT: Atraumatic





- Neck


Neck: Supple, no meningeal sign





- Cardiac


Cardiac: RRR





- Respiratory


Respiratory: No respiratory distress





- Abdomen


Abdomen: Normal bowel sounds





- Female 


Female : Deferred





- Rectal


Rectal: Deferred





- Derm


Derm: Normal color





- Extremities


Extremities: No deformity





- Neuro


Neuro: Alert and oriented X 3, CNs 2-12 intact, No motor deficit, No sensory 

deficit, Normal speech





PD ED PE EXPANDED





- Back


Back: Soft tissue tenderness (Right-sided paravertebral tenderness), Straight 

leg raise + R.  No: Vertebral tenderness, CVA TTP left





Results





- Vitals


Vitals: 


                               Vital Signs - 24 hr











  21





  20:40 21:02 21:27


 


Temperature 36.2 C L  


 


Heart Rate 94  


 


Respiratory 16 16 15





Rate   


 


Blood Pressure 119/74  


 


O2 Saturation 100  














  21





  22:15 22:34


 


Temperature 36.6 C 


 


Heart Rate 68 


 


Respiratory 20 15





Rate  


 


Blood Pressure 98/60 


 


O2 Saturation 95 








                                     Oxygen











O2 Source                      Room air

















- Labs


Labs: 


                                Laboratory Tests











  21





  21:15


 


Urine Color  YELLOW


 


Urine Clarity  HAZY


 


Urine pH  7.5


 


Ur Specific Gravity  1.020


 


Urine Protein  NEGATIVE


 


Urine Glucose (UA)  NEGATIVE


 


Urine Ketones  NEGATIVE


 


Urine Occult Blood  NEGATIVE


 


Urine Nitrite  NEGATIVE


 


Urine Bilirubin  NEGATIVE


 


Urine Urobilinogen  0.2 (NORMAL)


 


Ur Leukocyte Esterase  NEGATIVE


 


Urine RBC  None Seen


 


Urine WBC  0-3


 


Ur Squamous Epith Cells  NONE SEEN


 


Amorphous Sediment  Marked


 


Urine Bacteria  None Seen


 


Ur Microscopic Review  INDICATED


 


Urine Culture Comments  NOT INDICATED














PD MEDICAL DECISION MAKING





- ED course


Complexity details: reviewed results, d/w patient


ED course: 


Patient is a 31-year-old female presenting to the emergency department with 

right-sided low back pain and symptoms consistent with sciatica down the right 

leg.  Afebrile, hemodynamically stable on arrival to the emergency department.  

Did initially report right-sided weakness along with the sensation of shooting 

pains down into her right leg however was able to ambulate throughout the 

department with ease and there was no indication of neurologic deficit in her 

lower extremities.  No other red flags concerning for acute spinal cord 

compression at this time.  Patient given medication for symptomatic management. 

Urinalysis was negative for any kind of any indication of infection.  Will 

discharge with course of medication for symptomatic management.  Encourage 

careful follow-up with primary care or return to the emergency department for 

new or worsening symptoms.








Departure





- Departure


Disposition: 01 Home, Self Care


Clinical Impression: 


 Back pain





Condition: Fair


Instructions:  NARCOTIC, Oral, ED Neck Back Pain General


Prescriptions: 


HYDROcod/ACETAM 5/325 [Norco 5/325] 1 - 2 ea PO Q6H PRN #14 tablet


 PRN Reason: Pain


Comments: 


I will be prescribing some medication to take for pain.  This was sent to 

[facility] in [place].  Please fill this prescription and take as needed.  

Please follow-up with your primary care doctor.  If you have any new or 

worsening symptoms please return to the emergency department.


Discharge Date/Time: 21 22:35

## 2022-03-14 ENCOUNTER — HOSPITAL ENCOUNTER (EMERGENCY)
Dept: HOSPITAL 76 - ED | Age: 32
Discharge: HOME | End: 2022-03-14
Payer: SELF-PAY

## 2022-03-14 VITALS — DIASTOLIC BLOOD PRESSURE: 68 MMHG | SYSTOLIC BLOOD PRESSURE: 120 MMHG

## 2022-03-14 DIAGNOSIS — R11.2: ICD-10-CM

## 2022-03-14 DIAGNOSIS — F17.200: ICD-10-CM

## 2022-03-14 DIAGNOSIS — G44.209: ICD-10-CM

## 2022-03-14 DIAGNOSIS — K04.7: Primary | ICD-10-CM

## 2022-03-14 LAB
ALBUMIN DIAFP-MCNC: 5.1 G/DL (ref 3.2–5.5)
ALBUMIN/GLOB SERPL: 1.5 {RATIO} (ref 1–2.2)
ALP SERPL-CCNC: 69 IU/L (ref 42–121)
ALT SERPL W P-5'-P-CCNC: 15 IU/L (ref 10–60)
ANION GAP SERPL CALCULATED.4IONS-SCNC: 14 MMOL/L (ref 6–13)
AST SERPL W P-5'-P-CCNC: 18 IU/L (ref 10–42)
BASOPHILS NFR BLD AUTO: 0 10^3/UL (ref 0–0.1)
BASOPHILS NFR BLD AUTO: 0.5 %
BILIRUB BLD-MCNC: 0.9 MG/DL (ref 0.2–1)
BILIRUB UR QL CFM: NEGATIVE
BUN SERPL-MCNC: 13 MG/DL (ref 6–20)
CALCIUM UR-MCNC: 10.2 MG/DL (ref 8.5–10.3)
CHLORIDE SERPL-SCNC: 102 MMOL/L (ref 101–111)
CLARITY UR REFRACT.AUTO: (no result)
CO2 SERPL-SCNC: 27 MMOL/L (ref 21–32)
CREAT SERPLBLD-SCNC: 0.9 MG/DL (ref 0.4–1)
EOSINOPHIL # BLD AUTO: 0 10^3/UL (ref 0–0.7)
EOSINOPHIL NFR BLD AUTO: 0.5 %
ERYTHROCYTE [DISTWIDTH] IN BLOOD BY AUTOMATED COUNT: 12.2 % (ref 12–15)
GFRSERPLBLD MDRD-ARVRAT: 72 ML/MIN/{1.73_M2} (ref 89–?)
GLOBULIN SER-MCNC: 3.4 G/DL (ref 2.1–4.2)
GLUCOSE SERPL-MCNC: 104 MG/DL (ref 70–100)
GLUCOSE UR QL STRIP.AUTO: NEGATIVE MG/DL
HCG UR QL: NEGATIVE
HCT VFR BLD AUTO: 50.2 % (ref 37–47)
HGB UR QL STRIP: 17.1 G/DL (ref 12–16)
KETONES UR QL STRIP.AUTO: 40 MG/DL
LIPASE SERPL-CCNC: 29 U/L (ref 22–51)
LYMPHOCYTES # SPEC AUTO: 2.1 10^3/UL (ref 1.5–3.5)
LYMPHOCYTES NFR BLD AUTO: 25.1 %
MCH RBC QN AUTO: 32.1 PG (ref 27–31)
MCHC RBC AUTO-ENTMCNC: 34.1 G/DL (ref 32–36)
MCV RBC AUTO: 94.2 FL (ref 81–99)
MONOCYTES # BLD AUTO: 0.7 10^3/UL (ref 0–1)
MONOCYTES NFR BLD AUTO: 8 %
MUCOUS THREADS URNS QL MICRO: (no result)
NEUTROPHILS # BLD AUTO: 5.4 10^3/UL (ref 1.5–6.6)
NEUTROPHILS # SNV AUTO: 8.2 X10^3/UL (ref 4.8–10.8)
NEUTROPHILS NFR BLD AUTO: 65.7 %
NITRITE UR QL STRIP.AUTO: NEGATIVE
NRBC # BLD AUTO: 0 /100WBC
NRBC # BLD AUTO: 0 X10^3/UL
PDW BLD AUTO: 9.3 FL (ref 7.9–10.8)
PH UR STRIP.AUTO: 5.5 PH (ref 5–7.5)
PLATELET # BLD: 280 10^3/UL (ref 130–450)
POTASSIUM SERPL-SCNC: 3.8 MMOL/L (ref 3.5–5)
PROT SPEC-MCNC: 8.5 G/DL (ref 6.7–8.2)
PROT UR STRIP.AUTO-MCNC: 30 MG/DL
RBC # UR STRIP.AUTO: NEGATIVE /UL
RBC MAR: 5.33 10^6/UL (ref 4.2–5.4)
SODIUM SERPLBLD-SCNC: 143 MMOL/L (ref 135–145)
SP GR UR STRIP.AUTO: >=1.03 (ref 1–1.03)
SQUAMOUS URNS QL MICRO: (no result)
UROBILINOGEN UR QL STRIP.AUTO: (no result) E.U./DL
UROBILINOGEN UR STRIP.AUTO-MCNC: NEGATIVE MG/DL
WBC # UR MANUAL: (no result) /HPF (ref 0–5)

## 2022-03-14 PROCEDURE — 81025 URINE PREGNANCY TEST: CPT

## 2022-03-14 PROCEDURE — 81003 URINALYSIS AUTO W/O SCOPE: CPT

## 2022-03-14 PROCEDURE — 81001 URINALYSIS AUTO W/SCOPE: CPT

## 2022-03-14 PROCEDURE — 83690 ASSAY OF LIPASE: CPT

## 2022-03-14 PROCEDURE — 36415 COLL VENOUS BLD VENIPUNCTURE: CPT

## 2022-03-14 PROCEDURE — 99283 EMERGENCY DEPT VISIT LOW MDM: CPT

## 2022-03-14 PROCEDURE — 99284 EMERGENCY DEPT VISIT MOD MDM: CPT

## 2022-03-14 PROCEDURE — 85025 COMPLETE CBC W/AUTO DIFF WBC: CPT

## 2022-03-14 PROCEDURE — 87086 URINE CULTURE/COLONY COUNT: CPT

## 2022-03-14 PROCEDURE — 80053 COMPREHEN METABOLIC PANEL: CPT

## 2022-03-14 NOTE — ED PHYSICIAN DOCUMENTATION
History of Present Illness





- Stated complaint


Stated Complaint: VOMITTING, SWEATS





- Chief complaint


Chief Complaint: General





- History obtained from


History obtained from: Patient





- Additonal information


Additional information: 


Patient with a history of ovarian cancer (in remission since ) presenting 

for evaluation of headache which started yesterday with associated right-sided 

dental pain.  She also reports associated nausea and vomiting yesterday and 

continued nausea today.Her headache was gradual in onset and is throbbing in 

nature.Her dental pain is sharp.Her emesis consisted of food initially and then 

was bilious.  Denies hematemesis.No emesis today.  Has not tried any medications

today for her pain.Denies trauma.Denies abdominal pain, chest pain, difficulty 

breathing, fever. Patient also states she feels like she has been sweating more 

today but denies fever.








Review of Systems


Constitutional: denies: Fever


Eyes: denies: Decreased vision


Nose: denies: Congestion


Throat: reports: Dental pain / toothache


Cardiac: denies: Chest pain / pressure


Respiratory: denies: Dyspnea


GI: reports: Nausea, Vomiting.  denies: Abdominal Pain


: denies: Dysuria, Hematuria


Skin: denies: Rash


Musculoskeletal: denies: Neck pain


Neurologic: reports: Headache.  denies: Syncope





PD PAST MEDICAL HISTORY





- Past Medical History


Past Medical History: Yes


Cardiovascular: None


Respiratory: None


Neuro: None


Endocrine/Autoimmune: None


GI: None


GYN: Ovarian cysts, Ovarian cancer


: None


HEENT: Chronic hearing loss


Psych: Depression, Anxiety


Musculoskeletal: Other


Derm: None





- Past Surgical History


Past Surgical History: Yes


General: Appendectomy, Other


/GYN:  section, Oophrectomy





- Present Medications


Home Medications: 


                                Ambulatory Orders











 Medication  Instructions  Recorded  Confirmed


 


Ondansetron Odt [Zofran] 4 mg TL Q6H PRN #10 tablet 22 


 


clindamycin HCL [Cleocin HCl] 300 mg PO TID 7 Days #21 cap 22 














- Allergies


Allergies/Adverse Reactions: 


                                    Allergies











Allergy/AdvReac Type Severity Reaction Status Date / Time


 


adhesive Allergy  Rash Verified 22 20:13


 


Penicillins Allergy  Hives Verified 22 20:13


 


vancomycin Allergy  Rash Verified 22 20:13


 


ketorolac [From Toradol] AdvReac  Headache Verified 22 20:13


 


latex AdvReac  Rash Verified 22 20:13














- Social History


Does the pt smoke?: Yes


Smoking Status: Current every day smoker


Does the pt drink ETOH?: No


Does the pt have substance abuse?: Yes





- Immunizations


Immunizations are current?: Yes





- POLST


Patient has POLST: No





PD ED PE NORMAL





- General


General: Alert and oriented X 3, No acute distress, Well developed/nourished





- HEENT


HEENT: Atraumatic, PERRL, EOMI





- Neck


Neck: Supple, no meningeal sign





- Cardiac


Cardiac: RRR, No murmur, Strong equal pulses





- Respiratory


Respiratory: No respiratory distress, Clear bilaterally





- Abdomen


Abdomen: Normal bowel sounds, Soft, Non tender, Non distended





- Derm


Derm: Normal color, Warm and dry, No rash, Other (Not diaphoretic)





- Extremities


Extremities: No deformity





- Neuro


Neuro: Alert and oriented X 3, CNs 2-12 intact, No motor deficit, No sensory def

icit, Normal speech, Other (Normal unassisted gait)





- Psych


Psych: Normal mood, Normal affect





PD ED PE EXPANDED





- HEENT


HEENT: Other (Multiple teeth with evidence of decay,Chipped Right lower molar, 

no palpable abscess, Normal voice, no trismus, no brawny induration, Very mild 

facial swelling appreciable to right zygomatic area with no overlying crepitus 

and no fluctuance)


HEENT Visual: 


                            __________________________














                            __________________________





 1 - deformity








Results





- Vitals


Vitals: 


                               Vital Signs - 24 hr











  22





  20:08 22:13 22:23


 


Temperature 36.7 C  36.6 C


 


Heart Rate 112 H 72 72


 


Respiratory 18 16 16





Rate   


 


Blood Pressure 121/105 H 120/68 120/68


 


O2 Saturation 100 100 100








                                     Oxygen











O2 Source                      Room air

















- Labs


Labs: 


                                Laboratory Tests











  22





  20:28 20:28 21:31


 


WBC  8.2  


 


RBC  5.33  


 


Hgb  17.1 H  


 


Hct  50.2 H  


 


MCV  94.2  


 


MCH  32.1 H  


 


MCHC  34.1  


 


RDW  12.2  


 


Plt Count  280  


 


MPV  9.3  


 


Neut # (Auto)  5.4  


 


Lymph # (Auto)  2.1  


 


Mono # (Auto)  0.7  


 


Eos # (Auto)  0.0  


 


Baso # (Auto)  0.0  


 


Absolute Nucleated RBC  0.00  


 


Nucleated RBC %  0.0  


 


Sodium   143 


 


Potassium   3.8 


 


Chloride   102 


 


Carbon Dioxide   27 


 


Anion Gap   14.0 H 


 


BUN   13 


 


Creatinine   0.9 


 


Estimated GFR (MDRD)   72 L 


 


Glucose   104 H 


 


Calcium   10.2 


 


Total Bilirubin   0.9 


 


AST   18 


 


ALT   15 


 


Alkaline Phosphatase   69 


 


Total Protein   8.5 H 


 


Albumin   5.1 


 


Globulin   3.4 


 


Albumin/Globulin Ratio   1.5 


 


Lipase   29 


 


Urine Color    YELLOW


 


Urine Clarity    N


 


Urine pH    5.5


 


Ur Specific Gravity    >=1.030 H


 


Urine Protein    30 H


 


Urine Glucose (UA)    NEGATIVE


 


Urine Ketones    40 H


 


Urine Occult Blood    NEGATIVE


 


Urine Nitrite    NEGATIVE


 


Urine Bilirubin    NEGATIVE


 


Urine Urobilinogen    0.2 (NORMAL)


 


Ur Leukocyte Esterase    NEGATIVE


 


Urine RBC    None Seen


 


Urine WBC    0-3


 


Ur Squamous Epith Cells    RARE Squamous


 


Urine Bacteria    Rare


 


Urine Mucus    Marked Strands


 


Ur Microscopic Review    INDICATED


 


Urine Culture Comments    NOT INDICATED


 


Urine HCG, Qual   














  22





  21:31


 


WBC 


 


RBC 


 


Hgb 


 


Hct 


 


MCV 


 


MCH 


 


MCHC 


 


RDW 


 


Plt Count 


 


MPV 


 


Neut # (Auto) 


 


Lymph # (Auto) 


 


Mono # (Auto) 


 


Eos # (Auto) 


 


Baso # (Auto) 


 


Absolute Nucleated RBC 


 


Nucleated RBC % 


 


Sodium 


 


Potassium 


 


Chloride 


 


Carbon Dioxide 


 


Anion Gap 


 


BUN 


 


Creatinine 


 


Estimated GFR (MDRD) 


 


Glucose 


 


Calcium 


 


Total Bilirubin 


 


AST 


 


ALT 


 


Alkaline Phosphatase 


 


Total Protein 


 


Albumin 


 


Globulin 


 


Albumin/Globulin Ratio 


 


Lipase 


 


Urine Color 


 


Urine Clarity 


 


Urine pH 


 


Ur Specific Gravity 


 


Urine Protein 


 


Urine Glucose (UA) 


 


Urine Ketones 


 


Urine Occult Blood 


 


Urine Nitrite 


 


Urine Bilirubin 


 


Urine Urobilinogen 


 


Ur Leukocyte Esterase 


 


Urine RBC 


 


Urine WBC 


 


Ur Squamous Epith Cells 


 


Urine Bacteria 


 


Urine Mucus 


 


Ur Microscopic Review 


 


Urine Culture Comments 


 


Urine HCG, Qual  NEGATIVE














PD MEDICAL DECISION MAKING





- ED course


ED course: 





Patient presenting for evaluation of headache and dental pain.Initial vitals she

is slightly tachycardic which I believe is related to pain.I do not think she 

appear septic.  She has no meningeal symptoms.  I do not think her headaches 

suggest subarachnoid hemorrhage or dissection.Her neuro exam is normal I do not 

think she needs an emergent head CT.Patient does have evidence of dental 

infection.  No visible abscess, no airway compromise.Labs ordered from 

triage.Abdominal exam remains benign.Pain and nausea were controlled with p.o. 

medications and she is able to hydrate orally.We will start patient on 

clindamycin given her penicillin allergy.  Discussed need for close dental 

follow-up.She is aware of strict return precautions for worsening pain, 

vomiting, swelling or with any concerns.





Departure





- Departure


Disposition: 01 Home, Self Care


Clinical Impression: 


 Dental infection





Headache


Qualifiers:


 Headache type: tension-type Headache chronicity pattern: acute headache 

Intractability: not intractable Qualified Code(s): G44.209 - Tension-type 

headache, unspecified, not intractable





Nausea & vomiting


Qualifiers:


 Vomiting type: unspecified Qualified Code(s): R11.2 - Nausea with vomiting, 

unspecified





Condition: Stable


Instructions:  ED Abscess Dental, ED Headache Tension


Prescriptions: 


clindamycin HCL [Cleocin HCl] 300 mg PO TID 7 Days #21 cap


Ondansetron Odt [Zofran] 4 mg TL Q6H PRN #10 tablet


 PRN Reason: Nausea / Vomiting


Comments: 


It appears that you have an infected tooth Which I believe is the cause of your 

symptoms.  Your labs were normal.  You should continue taking the antibiotic and

 pain medications as needed.  You should call a dentist in the morning for close

 follow-up. Please return to the emergency department if you notice worsening 

swelling, change in your headache, inability to keep down liquids, Trouble 

breathing or any concerns.


Discharge Date/Time: 22 22:29

## 2022-05-07 ENCOUNTER — HOSPITAL ENCOUNTER (EMERGENCY)
Dept: HOSPITAL 76 - ED | Age: 32
Discharge: HOME | End: 2022-05-07
Payer: SELF-PAY

## 2022-05-07 VITALS — SYSTOLIC BLOOD PRESSURE: 100 MMHG | DIASTOLIC BLOOD PRESSURE: 66 MMHG

## 2022-05-07 DIAGNOSIS — B34.9: Primary | ICD-10-CM

## 2022-05-07 DIAGNOSIS — F17.200: ICD-10-CM

## 2022-05-07 LAB
ANION GAP SERPL CALCULATED.4IONS-SCNC: 9 MMOL/L (ref 6–13)
B PARAPERT DNA SPEC QL NAA+PROBE: NOT DETECTED
B PERT DNA SPEC QL NAA+PROBE: NOT DETECTED
BASOPHILS NFR BLD AUTO: 0 10^3/UL (ref 0–0.1)
BASOPHILS NFR BLD AUTO: 0.5 %
BUN SERPL-MCNC: 13 MG/DL (ref 6–20)
C PNEUM DNA NPH QL NAA+NON-PROBE: NOT DETECTED
CALCIUM UR-MCNC: 9.3 MG/DL (ref 8.5–10.3)
CHLORIDE SERPL-SCNC: 104 MMOL/L (ref 101–111)
CLARITY UR REFRACT.AUTO: CLEAR
CO2 SERPL-SCNC: 27 MMOL/L (ref 21–32)
CREAT SERPLBLD-SCNC: 0.8 MG/DL (ref 0.4–1)
EOSINOPHIL # BLD AUTO: 0 10^3/UL (ref 0–0.7)
EOSINOPHIL NFR BLD AUTO: 0.7 %
ERYTHROCYTE [DISTWIDTH] IN BLOOD BY AUTOMATED COUNT: 12.4 % (ref 12–15)
FLUAV RNA RESP QL NAA+PROBE: NOT DETECTED
GFRSERPLBLD MDRD-ARVRAT: 82 ML/MIN/{1.73_M2} (ref 89–?)
GLUCOSE SERPL-MCNC: 98 MG/DL (ref 70–100)
GLUCOSE UR QL STRIP.AUTO: NEGATIVE MG/DL
HAEM INFLU B DNA SPEC QL NAA+PROBE: NOT DETECTED
HCOV 229E RNA SPEC QL NAA+PROBE: NOT DETECTED
HCOV HKU1 RNA UPPER RESP QL NAA+PROBE: NOT DETECTED
HCOV NL63 RNA ASPIRATE QL NAA+PROBE: NOT DETECTED
HCOV OC43 RNA SPEC QL NAA+PROBE: NOT DETECTED
HCT VFR BLD AUTO: 46.9 % (ref 37–47)
HGB UR QL STRIP: 15.5 G/DL (ref 12–16)
HMPV AG SPEC QL: NOT DETECTED
HPIV1 RNA NPH QL NAA+PROBE: NOT DETECTED
HPIV2 SPEC QL CULT: NOT DETECTED
HPIV3 AB TITR SER CF: NOT DETECTED {TITER}
HPIV4 RNA SPEC QL NAA+PROBE: NOT DETECTED
KETONES UR QL STRIP.AUTO: NEGATIVE MG/DL
LYMPHOCYTES # SPEC AUTO: 1.5 10^3/UL (ref 1.5–3.5)
LYMPHOCYTES NFR BLD AUTO: 35.6 %
M PNEUMO DNA SPEC QL NAA+PROBE: NOT DETECTED
MCH RBC QN AUTO: 31.3 PG (ref 27–31)
MCHC RBC AUTO-ENTMCNC: 33 G/DL (ref 32–36)
MCV RBC AUTO: 94.7 FL (ref 81–99)
MONOCYTES # BLD AUTO: 0.4 10^3/UL (ref 0–1)
MONOCYTES NFR BLD AUTO: 8.6 %
NEUTROPHILS # BLD AUTO: 2.2 10^3/UL (ref 1.5–6.6)
NEUTROPHILS # SNV AUTO: 4.1 X10^3/UL (ref 4.8–10.8)
NEUTROPHILS NFR BLD AUTO: 54.6 %
NITRITE UR QL STRIP.AUTO: NEGATIVE
NRBC # BLD AUTO: 0 /100WBC
NRBC # BLD AUTO: 0 X10^3/UL
PDW BLD AUTO: 9.5 FL (ref 7.9–10.8)
PH UR STRIP.AUTO: 7 PH (ref 5–7.5)
PLATELET # BLD: 219 10^3/UL (ref 130–450)
POTASSIUM SERPL-SCNC: 4 MMOL/L (ref 3.5–5)
PROT UR STRIP.AUTO-MCNC: NEGATIVE MG/DL
RBC # UR STRIP.AUTO: NEGATIVE /UL
RBC MAR: 4.95 10^6/UL (ref 4.2–5.4)
RSV RNA RESP QL NAA+PROBE: NOT DETECTED
RV+EV RNA SPEC QL NAA+PROBE: NOT DETECTED
SARS-COV-2 RNA PNL SPEC NAA+PROBE: NOT DETECTED
SODIUM SERPLBLD-SCNC: 140 MMOL/L (ref 135–145)
SP GR UR STRIP.AUTO: 1.02 (ref 1–1.03)
UROBILINOGEN UR QL STRIP.AUTO: (no result) E.U./DL
UROBILINOGEN UR STRIP.AUTO-MCNC: NEGATIVE MG/DL

## 2022-05-07 PROCEDURE — 87633 RESP VIRUS 12-25 TARGETS: CPT

## 2022-05-07 PROCEDURE — 81001 URINALYSIS AUTO W/SCOPE: CPT

## 2022-05-07 PROCEDURE — 96375 TX/PRO/DX INJ NEW DRUG ADDON: CPT

## 2022-05-07 PROCEDURE — 80048 BASIC METABOLIC PNL TOTAL CA: CPT

## 2022-05-07 PROCEDURE — 85025 COMPLETE CBC W/AUTO DIFF WBC: CPT

## 2022-05-07 PROCEDURE — 36415 COLL VENOUS BLD VENIPUNCTURE: CPT

## 2022-05-07 PROCEDURE — 99282 EMERGENCY DEPT VISIT SF MDM: CPT

## 2022-05-07 PROCEDURE — 81003 URINALYSIS AUTO W/O SCOPE: CPT

## 2022-05-07 PROCEDURE — 96374 THER/PROPH/DIAG INJ IV PUSH: CPT

## 2022-05-07 PROCEDURE — 87086 URINE CULTURE/COLONY COUNT: CPT

## 2022-05-07 NOTE — ED PHYSICIAN DOCUMENTATION
History of Present Illness





- Stated complaint


Stated Complaint: C+,HEADACHE,FATIGUE





- Chief complaint


Chief Complaint: General





- History obtained from


History obtained from: Patient





- Additonal information


Additional information: 





32-year-old woman with history of ovarian cancer and appendectomy as well as 

chronic hearing loss presents for the evaluation of an illness.  It started for 

about a week and a half or a little more with cough and congestion.  That was 

improving but over the last 2 days or so she has developed headaches, fevers, 

vomiting, diarrhea, and subjective fevers.  Her son was recently ill with a 

viral syndrome and her mom is also in the department with similar symptoms right

now.  No recent travel.





Review of Systems


Ten Systems: 10 systems reviewed and negative


Constitutional: reports: Fever, Chills, Myalgias, Fatigue


Nose: reports: Rhinorrhea / runny nose


Throat: denies: Sore throat


Respiratory: denies: Dyspnea, Cough


GI: reports: Nausea, Vomiting, Diarrhea.  denies: Abdominal Pain





PD PAST MEDICAL HISTORY





- Past Medical History


Past Medical History: Yes


Cardiovascular: None


Respiratory: None


Neuro: None


Endocrine/Autoimmune: None


GI: None


GYN: Ovarian cysts, Ovarian cancer


: None


HEENT: Chronic hearing loss


Psych: Depression, Anxiety


Musculoskeletal: Other


Derm: None





- Past Surgical History


Past Surgical History: Yes


General: Appendectomy, Other


/GYN:  section, Oophrectomy





- Present Medications


Home Medications: 


                                Ambulatory Orders











 Medication  Instructions  Recorded  Confirmed


 


Ondansetron Odt [Zofran] 4 mg TL Q6H PRN #10 tablet 22 


 


clindamycin HCL [Cleocin HCl] 300 mg PO TID 7 Days #21 cap 22 


 


Ondansetron Odt [Zofran] 4 mg TL Q6H PRN #10 tablet 22 


 


Oxycodone HCl/Acetaminophen 1 - 2 each PO Q6H PRN #10 tablet 22 





[Percocet 5-325 mg Tablet]   














- Allergies


Allergies/Adverse Reactions: 


                                    Allergies











Allergy/AdvReac Type Severity Reaction Status Date / Time


 


adhesive Allergy  Rash Verified 22 12:29


 


Penicillins Allergy  Hives Verified 22 12:29


 


vancomycin Allergy  Rash Verified 22 12:29


 


ketorolac [From Toradol] AdvReac  Headache Verified 22 12:29


 


latex AdvReac  Rash Verified 22 12:29














- Social History


Does the pt smoke?: Yes


Smoking Status: Current every day smoker


Does the pt drink ETOH?: No


Does the pt have substance abuse?: Yes





- Immunizations


Immunizations are current?: Yes





- POLST


Patient has POLST: No





PD ED PE NORMAL





- Vitals


Vital signs reviewed: Yes





- General


General: Alert and oriented X 3, No acute distress (Without wearing sunglasses 

due to photophobia)





- HEENT


HEENT: PERRL, EOMI, Pharynx benign, Other (Moderate anterior cervical 

adenopathy)





- Neck


Neck: Supple, no meningeal sign, No bony TTP





- Cardiac


Cardiac: RRR, No murmur





- Respiratory


Respiratory: No respiratory distress, Clear bilaterally





- Abdomen


Abdomen: Non tender





- Derm


Derm: Normal color, Warm and dry, No rash





- Neuro


Neuro: Alert and oriented X 3, Normal speech





Results





- Vitals


Vitals: 


                               Vital Signs - 24 hr











  22





  12:25 12:28 14:28


 


Temperature 36.5 C 36.5 C 


 


Heart Rate 58 L 58 L 60


 


Respiratory 20 20 16





Rate   


 


Blood Pressure 133/77 H 133/77 H 120/76


 


O2 Saturation 100 100 100














  22





  14:49


 


Temperature 


 


Heart Rate 57 L


 


Respiratory 16





Rate 


 


Blood Pressure 99/63


 


O2 Saturation 100








                                     Oxygen











O2 Source                      Room air

















- Labs


Labs: 


                                Laboratory Tests











  22





  13:35 13:49 13:49


 


WBC   4.1 L 


 


RBC   4.95 


 


Hgb   15.5 


 


Hct   46.9 


 


MCV   94.7 


 


MCH   31.3 H 


 


MCHC   33.0 


 


RDW   12.4 


 


Plt Count   219 


 


MPV   9.5 


 


Neut # (Auto)   2.2 


 


Lymph # (Auto)   1.5 


 


Mono # (Auto)   0.4 


 


Eos # (Auto)   0.0 


 


Baso # (Auto)   0.0 


 


Absolute Nucleated RBC   0.00 


 


Nucleated RBC %   0.0 


 


Sodium    140


 


Potassium    4.0


 


Chloride    104


 


Carbon Dioxide    27


 


Anion Gap    9.0


 


BUN    13


 


Creatinine    0.8


 


Estimated GFR (MDRD)    82 L


 


Glucose    98


 


Calcium    9.3


 


Urine Color   


 


Urine Clarity   


 


Urine pH   


 


Ur Specific Gravity   


 


Urine Protein   


 


Urine Glucose (UA)   


 


Urine Ketones   


 


Urine Occult Blood   


 


Urine Nitrite   


 


Urine Bilirubin   


 


Urine Urobilinogen   


 


Ur Leukocyte Esterase   


 


Ur Microscopic Review   


 


Urine Culture Comments   


 


Nasal Adenovirus (PCR)  NOT DETECTED  


 


Nasal B. parapertussis DNA (PCR)  NOT DETECTED  


 


Nasal Coronavir 229E PCR  NOT DETECTED  


 


Nasal Coronavir HKU1 PCR  NOT DETECTED  


 


Nasal Coronavir NL63 PCR  NOT DETECTED  


 


Nasal Coronavir OC43 PCR  NOT DETECTED  


 


Nasal Enterovir/Rhinovir PCR  NOT DETECTED  


 


Nasal Influenza B PCR  NOT DETECTED  


 


Nasal Influenza A PCR  NOT DETECTED  


 


Nasal Parainfluen 1 PCR  NOT DETECTED  


 


Nasal Parainfluen 2 PCR  NOT DETECTED  


 


Nasal Parainfluen 3 PCR  NOT DETECTED  


 


Nasal Parainfluen 4 PCR  NOT DETECTED  


 


Nasal RSV (PCR)  NOT DETECTED  


 


Nasal B.pertussis DNA PCR  NOT DETECTED  


 


Nasal C.pneumoniae (PCR)  NOT DETECTED  


 


Ji Human Metapneumo PCR  NOT DETECTED  


 


Nasal M.pneumoniae (PCR)  NOT DETECTED  


 


Nasal SARS-CoV-2 (PCR)  NOT DETECTED  














  22





  14:36


 


WBC 


 


RBC 


 


Hgb 


 


Hct 


 


MCV 


 


MCH 


 


MCHC 


 


RDW 


 


Plt Count 


 


MPV 


 


Neut # (Auto) 


 


Lymph # (Auto) 


 


Mono # (Auto) 


 


Eos # (Auto) 


 


Baso # (Auto) 


 


Absolute Nucleated RBC 


 


Nucleated RBC % 


 


Sodium 


 


Potassium 


 


Chloride 


 


Carbon Dioxide 


 


Anion Gap 


 


BUN 


 


Creatinine 


 


Estimated GFR (MDRD) 


 


Glucose 


 


Calcium 


 


Urine Color  YELLOW


 


Urine Clarity  CLEAR


 


Urine pH  7.0


 


Ur Specific Gravity  1.020


 


Urine Protein  NEGATIVE


 


Urine Glucose (UA)  NEGATIVE


 


Urine Ketones  NEGATIVE


 


Urine Occult Blood  NEGATIVE


 


Urine Nitrite  NEGATIVE


 


Urine Bilirubin  NEGATIVE


 


Urine Urobilinogen  0.2 (NORMAL)


 


Ur Leukocyte Esterase  NEGATIVE


 


Ur Microscopic Review  NOT INDICATED


 


Urine Culture Comments  NOT INDICATED


 


Nasal Adenovirus (PCR) 


 


Nasal B. parapertussis DNA (PCR) 


 


Nasal Coronavir 229E PCR 


 


Nasal Coronavir HKU1 PCR 


 


Nasal Coronavir NL63 PCR 


 


Nasal Coronavir OC43 PCR 


 


Nasal Enterovir/Rhinovir PCR 


 


Nasal Influenza B PCR 


 


Nasal Influenza A PCR 


 


Nasal Parainfluen 1 PCR 


 


Nasal Parainfluen 2 PCR 


 


Nasal Parainfluen 3 PCR 


 


Nasal Parainfluen 4 PCR 


 


Nasal RSV (PCR) 


 


Nasal B.pertussis DNA PCR 


 


Nasal C.pneumoniae (PCR) 


 


Ji Human Metapneumo PCR 


 


Nasal M.pneumoniae (PCR) 


 


Nasal SARS-CoV-2 (PCR) 














PD MEDICAL DECISION MAKING





- ED course


ED course: 





32-year-old woman presents with viral syndrome, lab work is relatively 

unremarkable except for very mild lymphopenia.  Bio fire swab negative and 

feeling much better after IV fluids, Zofran morphine and droperidol.





Departure





- Departure


Disposition: 01 Home, Self Care


Clinical Impression: 


 Viral syndrome





Condition: Good


Record reviewed to determine appropriate education?: Yes


Instructions:  ED Viral Syndrome


Prescriptions: 


Oxycodone HCl/Acetaminophen [Percocet 5-325 mg Tablet] 1 - 2 each PO Q6H PRN #10

tablet


 PRN Reason: pain


Ondansetron Odt [Zofran] 4 mg TL Q6H PRN #10 tablet


 PRN Reason: Nausea / Vomiting


Comments: 


I sent your prescriptions electronically to [facility] in [facility].





Call your doctor to arrange a follow-up appointment, make the next available 

appointment.  In the interim, return anytime if worse or if new symptoms 

develop.





I am prescribing a short course of narcotic pain medication for you. These are 

potentially dangerous and addictive medications that should be used carefully.


These medications may constipate you. Take an over-the-counter stool softener 

(docusate) twice daily with plenty of water while taking these medications. If 

you go 24 hours without a bowel movement, take over-the-counter miralax, per 

package instructions.


Do not drink or drive while taking these medications.


If you received narcotic or sedating medications while in the emergency 

department, do not drive for 24 hours.


Store this medication in a safe, secure place and out of reach of children.


It is a violation of federal law to give or sell this medication to another 

person or to use in a manner other than prescribed.


The ED will not refill narcotic prescriptions, including prescriptions lost or 

stolen.


To dispose of unwanted medications:


1. [facility] at [address] in 

[place] has a medication drop box. They accept prescription medications (in 

pill form) Monday through Friday 9:00 a.m. to 5:00 p.m.


2. The [facility] [employer] accepts prescription medications (in

 pill form only) for disposal year round. Call [phone] for more 

information.


3. Contact the [facility] for the next [facility] sponsored prescription 

drug collection event. [phone], [phone], or [phone] 

[phone];


Note that many narcotic pain relievers also contain Tylenol/acetaminophen.  

Please ensure that your total dose of acetaminophen from all sources does not 

exceed 3 g (3000 mg) per day.





Forms:  Activity restrictions

## 2022-05-28 ENCOUNTER — HOSPITAL ENCOUNTER (EMERGENCY)
Dept: HOSPITAL 76 - ED | Age: 32
Discharge: HOME | End: 2022-05-28
Payer: SELF-PAY

## 2022-05-28 VITALS — DIASTOLIC BLOOD PRESSURE: 73 MMHG | SYSTOLIC BLOOD PRESSURE: 113 MMHG

## 2022-05-28 DIAGNOSIS — M70.52: Primary | ICD-10-CM

## 2022-05-28 PROCEDURE — 99282 EMERGENCY DEPT VISIT SF MDM: CPT

## 2022-05-28 PROCEDURE — 99283 EMERGENCY DEPT VISIT LOW MDM: CPT

## 2022-05-28 NOTE — ED PHYSICIAN DOCUMENTATION
History of Present Illness





- Stated complaint


Stated Complaint: LEFT KNEE PX & SWELLING,HOT





- Chief complaint


Chief Complaint: Ext Problem





- History obtained from


History obtained from: Patient





- History of Present Illness


Pain level max: 6


Pain level now: 4





- Additonal information


Additional information: 





32-year-old female presents to the emergency department left knee pain.  Started

about 3 to 4 days ago and is progressively worsened.  Worse with movement, 

palpation.  Denies any trauma. No fevers.  No chills.  Does not recall any skin 

injuries either.





Review of Systems


Constitutional: denies: Fever, Chills


Respiratory: denies: Cough


GI: denies: Nausea, Vomiting, Diarrhea


Skin: denies: Rash


Musculoskeletal: denies: Neck pain, Back pain


Neurologic: denies: Headache





PD PAST MEDICAL HISTORY





- Past Medical History


Cardiovascular: None


Respiratory: None


Neuro: None


Endocrine/Autoimmune: None


GI: None


GYN: Ovarian cysts, Ovarian cancer


: None


HEENT: Chronic hearing loss


Psych: Depression, Anxiety


Musculoskeletal: Other


Derm: None





- Past Surgical History


Past Surgical History: Yes


General: Appendectomy, Other


/GYN:  section, Oophrectomy





- Present Medications


Home Medications: 


                                Ambulatory Orders











 Medication  Instructions  Recorded  Confirmed


 


Ondansetron Odt [Zofran] 4 mg TL Q6H PRN #10 tablet 22 


 


clindamycin HCL [Cleocin HCl] 300 mg PO TID 7 Days #21 cap 22 


 


Ondansetron Odt [Zofran] 4 mg TL Q6H PRN #10 tablet 22 


 


Oxycodone HCl/Acetaminophen 1 - 2 each PO Q6H PRN #10 tablet 22 





[Percocet 5-325 mg Tablet]   


 


HYDROcod/ACETAM 5/325 [Norco 5/325] 1 - 2 ea PO Q6H PRN #14 tablet 22 


 


predniSONE [Deltasone] 40 mg PO DAILY #10 tablet 22 














- Allergies


Allergies/Adverse Reactions: 


                                    Allergies











Allergy/AdvReac Type Severity Reaction Status Date / Time


 


adhesive Allergy  Rash Verified 22 17:26


 


Penicillins Allergy  Hives Verified 22 17:26


 


vancomycin Allergy  Rash Verified 22 17:26


 


ketorolac [From Toradol] AdvReac  Headache Verified 22 17:26


 


latex AdvReac  Rash Verified 22 17:26














- Social History


Does the pt smoke?: Yes


Smoking Status: Current every day smoker


Does the pt drink ETOH?: No


Does the pt have substance abuse?: Yes





- Immunizations


Immunizations are current?: Yes





- POLST


Patient has POLST: No





PD ED PE NORMAL





- Vitals


Vital signs reviewed: Yes





- General


General: Alert and oriented X 3, No acute distress





- HEENT


HEENT: Moist mucous membranes





- Neck


Neck: Supple, no meningeal sign





- Cardiac


Cardiac: RRR





- Respiratory


Respiratory: No respiratory distress, Clear bilaterally





- Derm


Derm: Warm and dry





- Extremities


Extremities: Other (Left knee - Mild infrapatellar swelling and erythema.  No 

joint effusion.  No joint tenderness.  No bony tenderness.  Full range of motion

of the knee present.  Neurovascularly intact)





- Neuro


Neuro: Alert and oriented X 3





Results





- Vitals


Vitals: 


                               Vital Signs - 24 hr











  22





  17:21


 


Temperature 36.4 C L


 


Heart Rate 102 H


 


Respiratory 16





Rate 


 


Blood Pressure 113/73


 


O2 Saturation 100








                                     Oxygen











O2 Source                      Room air

















PD MEDICAL DECISION MAKING





- ED course


Complexity details: reviewed old records, considered differential, d/w patient


ED course: 





32-year-old female with a infrapatellar bursitis.  She states that she does not 

do well with NSAIDs.  We will therefore trial her on steroids and pain 

medication for home.  Also encouraged rest, compression.  No evidence of infecti

on.  No abscess.  No septic joint.  No evidence of gouty arthritis.  Patient 

counseled regarding signs and symptoms for which I believe and urgent re-

evaluation would be necessary. Patient with good understanding of and agreement 

to plan and is comfortable going home at this time





This document was made in part using voice recognition software. While efforts 

are made to proofread this document, sound alike and grammatical errors may 

occur.





Departure





- Departure


Disposition: 01 Home, Self Care


Clinical Impression: 


Bursitis


Qualifiers:


 Bursitis location: knee Knee bursitis location: infrapatellar bursitis 

Laterality: left Qualified Code(s): M70.52 - Other bursitis of knee, left knee





Condition: Good


Instructions:  ED Bursitis


Follow-Up: 


Your,doctor in 1 week [Other]


Prescriptions: 


predniSONE [Deltasone] 40 mg PO DAILY #10 tablet


HYDROcod/ACETAM 5/325 [Norco 5/325] 1 - 2 ea PO Q6H PRN #14 tablet


 PRN Reason: Pain


Comments: 


Continue to wrap the knee.  Return if you worsen.  The steroid should help 

decrease the inflammation in your knee.  Ice will help as well.  There is no 

evidence of infection in the knee joint itself.

## 2022-12-06 ENCOUNTER — HOSPITAL ENCOUNTER (EMERGENCY)
Dept: HOSPITAL 76 - ED | Age: 32
Discharge: HOME | End: 2022-12-06
Payer: SELF-PAY

## 2022-12-06 VITALS — SYSTOLIC BLOOD PRESSURE: 122 MMHG | DIASTOLIC BLOOD PRESSURE: 65 MMHG

## 2022-12-06 DIAGNOSIS — Z20.822: ICD-10-CM

## 2022-12-06 DIAGNOSIS — F17.200: ICD-10-CM

## 2022-12-06 DIAGNOSIS — J10.1: Primary | ICD-10-CM

## 2022-12-06 LAB
B PARAPERT DNA SPEC QL NAA+PROBE: NOT DETECTED
B PERT DNA SPEC QL NAA+PROBE: NOT DETECTED
C PNEUM DNA NPH QL NAA+NON-PROBE: NOT DETECTED
FLUAV RNA SPEC QL NAA+PROBE: DETECTED
HAEM INFLU B DNA SPEC QL NAA+PROBE: NOT DETECTED
HCOV 229E RNA SPEC QL NAA+PROBE: NOT DETECTED
HCOV HKU1 RNA UPPER RESP QL NAA+PROBE: NOT DETECTED
HCOV NL63 RNA ASPIRATE QL NAA+PROBE: NOT DETECTED
HCOV OC43 RNA SPEC QL NAA+PROBE: NOT DETECTED
HMPV AG SPEC QL: NOT DETECTED
HPIV1 RNA NPH QL NAA+PROBE: NOT DETECTED
HPIV2 SPEC QL CULT: NOT DETECTED
HPIV3 AB TITR SER CF: NOT DETECTED {TITER}
HPIV4 RNA SPEC QL NAA+PROBE: NOT DETECTED
M PNEUMO DNA SPEC QL NAA+PROBE: NOT DETECTED
RSV RNA RESP QL NAA+PROBE: NOT DETECTED
RV+EV RNA SPEC QL NAA+PROBE: NOT DETECTED
SARS-COV-2 RNA PNL SPEC NAA+PROBE: NOT DETECTED

## 2022-12-06 PROCEDURE — 87633 RESP VIRUS 12-25 TARGETS: CPT

## 2022-12-06 PROCEDURE — 99284 EMERGENCY DEPT VISIT MOD MDM: CPT

## 2022-12-06 PROCEDURE — 71045 X-RAY EXAM CHEST 1 VIEW: CPT

## 2022-12-06 PROCEDURE — 99282 EMERGENCY DEPT VISIT SF MDM: CPT

## 2022-12-06 NOTE — ED PHYSICIAN DOCUMENTATION
History of Present Illness





- Stated complaint


Stated Complaint: CHEST PRESSURE/FEVER/CHILLS





- Chief complaint


Chief Complaint: Resp





- History obtained from


History obtained from: Patient





- History of Present Illness


Timing: How many weeks ago (3)


Pain level max: 0


Pain level now: 0





- Additonal information


Additional information: 





Patient is a 32-year-old female who presents to the emergency department with 3 

weeks of fever, cough, body aches, chills.  She states her child has been sick 

with same.  Recently her child tested positive for influenza A.  She is 

complaining of chest pain, especially with coughing.  She also is out of her 

inhaler.  She does smoke.  Worse with coughing, deep breathing, nothing makes it

better.  She states pain is unrelieved with Motrin and Tylenol at home.





Review of Systems


Ten Systems: 10 systems reviewed and negative


Constitutional: denies: Fever, Chills


Nose: reports: Rhinorrhea / runny nose, Congestion


Cardiac: denies: Chest pain / pressure


Respiratory: reports: Cough.  denies: Wheezing


GI: denies: Nausea, Vomiting


Skin: denies: Rash


Musculoskeletal: denies: Neck pain, Back pain


Neurologic: denies: Headache





PD PAST MEDICAL HISTORY





- Past Medical History


Past Medical History: Yes


Cardiovascular: None


Respiratory: None


Neuro: None


Endocrine/Autoimmune: None


GI: None


GYN: Ovarian cysts, Ovarian cancer


: None


HEENT: Chronic hearing loss


Psych: Depression, Anxiety


Musculoskeletal: Other


Derm: None





- Past Surgical History


Past Surgical History: Yes


General: Appendectomy, Other


/GYN:  section, Oophrectomy





- Present Medications


Home Medications: 


                                Ambulatory Orders











 Medication  Instructions  Recorded  Confirmed


 


HYDROcod/ACETAM 5/325 [Norco 5/325] 1 - 2 tablet PO Q6H PRN #14 tablet 22 


 


Albuterol Sulf [Ventolin Hfa 1 - 2 puffs INH Q4HR PRN #1 each 22 





Inhaler]   


 


Benzonatate [Tessalon] 200 mg PO TID PRN #30 cap 22 


 


Oxycodone HCl/Acetaminophen 1 - 2 each PO Q6H PRN #10 tablet 22 





[Percocet 5-325 mg Tablet] MDD 6 tabs  














- Allergies


Allergies/Adverse Reactions: 


                                    Allergies











Allergy/AdvReac Type Severity Reaction Status Date / Time


 


adhesive Allergy  Rash Verified 22 14:14


 


Penicillins Allergy  Hives Verified 22 14:14


 


vancomycin Allergy  Rash Verified 22 14:14


 


ketorolac [From Toradol] AdvReac  Headache Verified 22 14:14


 


latex AdvReac  Rash Verified 22 14:14














- Social History


Does the pt smoke?: Yes


Smoking Status: Current every day smoker


Does the pt drink ETOH?: No


Does the pt have substance abuse?: Yes





- Immunizations


Immunizations are current?: Yes





- POLST


Patient has POLST: No





PD ED PE NORMAL





- Vitals


Vital signs reviewed: Yes





- General


General: Alert and oriented X 3, No acute distress





- HEENT


HEENT: PERRL, Ears normal, Moist mucous membranes





- Neck


Neck: Supple, no meningeal sign





- Cardiac


Cardiac: RRR, Strong equal pulses





- Respiratory


Respiratory: No respiratory distress, Clear bilaterally





- Abdomen


Abdomen: Soft, Non tender, Non distended





- Back


Back: No CVA TTP





- Derm


Derm: Warm and dry, No rash





- Extremities


Extremities: No edema, No calf tenderness / cord





- Neuro


Neuro: Alert and oriented X 3





- Psych


Psych: Normal mood, Normal affect





Results





- Vitals


Vitals: 


                               Vital Signs - 24 hr











  22





  14:10 16:13


 


Temperature 37.2 C 


 


Heart Rate 105 H 78


 


Respiratory 16 18





Rate  


 


Blood Pressure 108/75 122/65


 


O2 Saturation 98 99








                                     Oxygen











O2 Source                      Room air

















- Labs


Labs: 


                                Laboratory Tests











  22





  14:15


 


Nasal Adenovirus (PCR)  NOT DETECTED


 


Nasal B. parapertussis DNA (PCR)  NOT DETECTED


 


Nasal Coronavir 229E PCR  NOT DETECTED


 


Nasal Coronavir HKU1 PCR  NOT DETECTED


 


Nasal Coronavir NL63 PCR  NOT DETECTED


 


Nasal Coronavir OC43 PCR  NOT DETECTED


 


Nasal Enterovir/Rhinovir PCR  NOT DETECTED


 


Nasal Influenza A PCR  DETECTED A


 


Nasal Influenza B PCR  NOT DETECTED


 


Nasal Parainfluen 1 PCR  NOT DETECTED


 


Nasal Parainfluen 2 PCR  NOT DETECTED


 


Nasal Parainfluen 3 PCR  NOT DETECTED


 


Nasal Parainfluen 4 PCR  NOT DETECTED


 


Nasal RSV (PCR)  NOT DETECTED


 


Nasal B.pertussis DNA PCR  NOT DETECTED


 


Nasal C.pneumoniae (PCR)  NOT DETECTED


 


Ji Human Metapneumo PCR  NOT DETECTED


 


Nasal M.pneumoniae (PCR)  NOT DETECTED


 


Nasal SARS-CoV-2 (PCR)  NOT DETECTED














- Rads (name of study)


  ** cxr


Radiology: Final report received, EMP read contemporaneously, See rad report





PD MEDICAL DECISION MAKING





- ED course


Complexity details: reviewed results, re-evaluated patient, considered 

differential, d/w patient


ED course: 





32-year-old female presents to the emergency department with chest pain with 

coughing.  She has been sick for several weeks.  She is positive for influenza. 

Not a candidate for antiviral therapy.  We will have her follow-up with her 

doctor for further care.  We will continue supportive care.  Patient is well-

appearing, nontoxic.  Afebrile.  I am prescribing a short course of short-acting

opioid pain medication for this patient. I have reviewed the patients  and 

no concerning findings were noted. I have discussed that the opioids are for 

short term therapy only, and will not be refilled from the ED. patient counseled

regarding signs and symptoms for which I believe and urgent re-evaluation would 

be necessary. Patient with good understanding of and agreement to plan and is 

comfortable going home at this time





This document was made in part using voice recognition software. While efforts 

are made to proofread this document, sound alike and grammatical errors may 

occur.





Departure





- Departure


Disposition: 01 Home, Self Care


Clinical Impression: 


 Influenza A





Condition: Good


Instructions:  ED Flu


Follow-Up: 


your,doctor as needed [Other]


Prescriptions: 


Albuterol Sulf [Ventolin Hfa Inhaler] 1 - 2 puffs INH Q4HR PRN #1 each


 PRN Reason: Shortness Of Air/Wheezing


Oxycodone HCl/Acetaminophen [Percocet 5-325 mg Tablet] 1 - 2 each PO Q6H PRN #10

tablet MDD 6 tabs


 PRN Reason: pain


Benzonatate [Tessalon] 200 mg PO TID PRN #30 cap


 PRN Reason: Cough


Comments: 


Please follow-up with your doctor for further care.  You have tested positive 

for influenza A today.  Your chest x-ray does not show any evidence of 

pneumonia. Your prescriptions were sent to [facility] in [place].














I am prescribing a short course of narcotic pain medication for you. These are 

potentially dangerous and addictive medications that should be used carefully. 


These medications may constipate you. Take an over-the-counter stool softener 

(docusate) twice daily with plenty of water while taking these medications. If 

you go 24 hours without a bowel movement, take over-the-counter miralax, per 

package instructions.


Do not drink or drive while taking these medications. 


If you received narcotic or sedating medications while in the emergency 

department, do not drive for 24 hours.


Store this medication in a safe, secure place and out of reach of children. 


It is a violation of federal law to give or sell this medication to another 

person or to use in a manner other than prescribed.


The ED will not refill narcotic prescriptions, including prescriptions lost or 

stolen.


To dispose of unwanted medications:


1. [facility] at [address] in 

[place] has a medication drop box. They accept prescription medications (in 

pill form) Monday through Friday 9:00 a.m. to 5:00 p.m. 


2. The [facility] accepts prescription medications (in

 pill form only) for disposal year round. Call [phone] for more 

information. 


3. Contact the [facility] for the next [facility] sponsored prescription 

drug collection event. [phone], [phone], or [phone] 

[phone];


Discharge Date/Time: 22 17:50

## 2022-12-06 NOTE — XRAY REPORT
PROCEDURE:  Chest 1 View X-Ray

 

INDICATIONS:  SOA/Cough

 

TECHNIQUE:  One view of the chest was acquired.  

 

COMPARISON:  01/31/2020

 

FINDINGS:  

 

Surgical changes and devices:  None.  

 

Lungs and pleura:  No pleural effusions or pneumothorax.  Lungs are clear.  

 

Mediastinum:  Mediastinal contours appear normal.  Heart size is normal.  

 

Bones and chest wall:  No suspicious bony lesions.  Overlying soft tissues appear unremarkable.  

 

 

IMPRESSION:  

 

No acute cardiopulmonary process demonstrated radiographically.

 

Reviewed by: Jeffrey Schuster MD on 12/6/2022 2:18 PM [facility]

Approved by: Jeffrey Schuster MD on 12/6/2022 2:18 PM [facility]

 

 

Station ID:  [id number]

## 2023-02-13 ENCOUNTER — HOSPITAL ENCOUNTER (EMERGENCY)
Dept: HOSPITAL 76 - ED | Age: 33
Discharge: HOME | End: 2023-02-13
Payer: SELF-PAY

## 2023-02-13 VITALS — SYSTOLIC BLOOD PRESSURE: 123 MMHG | DIASTOLIC BLOOD PRESSURE: 84 MMHG

## 2023-02-13 DIAGNOSIS — K08.89: Primary | ICD-10-CM

## 2023-02-13 DIAGNOSIS — F17.200: ICD-10-CM

## 2023-02-13 DIAGNOSIS — Z91.040: ICD-10-CM

## 2023-02-13 PROCEDURE — 99283 EMERGENCY DEPT VISIT LOW MDM: CPT

## 2023-02-13 PROCEDURE — 96372 THER/PROPH/DIAG INJ SC/IM: CPT

## 2023-02-13 NOTE — ED PHYSICIAN DOCUMENTATION
History of Present Illness





- Stated complaint


Stated Complaint: TOOTH/JAW PX





- Chief complaint


Chief Complaint: Heent





- Additonal information


Additional information: 





32-year-old female presents emergency department for evaluation of cute onset 

right lower molar pain.  She has a decayed tooth and she just finished a 

weeklong course of clindamycin.  Reports that she stopped the antibiotics pain 

return.  She is taking Anbesol and Orajel without relief.  Using ice without 

relief.  She is a daily smoker.  Reports that she sees [facility] dental clinics 

and was unable to get an appointment until May.  She reports an allergy to any 

of the lidocaine's or canes thus I am unable to provide a dental block.





Review of Systems


Constitutional: reports: Reviewed and negative


Throat: reports: Dental pain / toothache





PD PAST MEDICAL HISTORY





- Past Medical History


Cardiovascular: None


Respiratory: None


Neuro: None


Endocrine/Autoimmune: None


GI: None


GYN: Ovarian cysts, Ovarian cancer


: None


HEENT: Chronic hearing loss


Psych: Depression, Anxiety


Musculoskeletal: Other


Derm: None





- Past Surgical History


Past Surgical History: Yes


General: Appendectomy, Other


/GYN:  section, Oophrectomy





- Present Medications


Home Medications: 


                                Ambulatory Orders











 Medication  Instructions  Recorded  Confirmed


 


HYDROcod/ACETAM 5/325 [Norco 5/325] 1 - 2 tablet PO Q6H PRN #14 tablet 22 


 


Albuterol Sulf [Ventolin Hfa 1 - 2 puffs INH Q4HR PRN #1 each 22 





Inhaler]   


 


Benzonatate [Tessalon] 200 mg PO TID PRN #30 cap 22 


 


Oxycodone HCl/Acetaminophen 1 - 2 each PO Q6H PRN #10 tablet 22 





[Percocet 5-325 mg Tablet] MDD 6 tabs  


 


Chlorhexidine Gluconate [Peridex] 5 ml MM DAILY #118 ml 23 


 


oxyCODONE [Roxicodone] 5 mg PO BID #5 tablet 23 














- Allergies


Allergies/Adverse Reactions: 


                                    Allergies











Allergy/AdvReac Type Severity Reaction Status Date / Time


 


adhesive Allergy  Rash Verified 23 19:43


 


Penicillins Allergy  Hives Verified 23 19:43


 


vancomycin Allergy  Rash Verified 23 19:43


 


ketorolac [From Toradol] AdvReac  Headache Verified 23 19:43


 


latex AdvReac  Rash Verified 23 19:43














- Social History


Does the pt smoke?: Yes


Smoking Status: Current every day smoker


Does the pt drink ETOH?: No


Does the pt have substance abuse?: Yes





- Immunizations


Immunizations are current?: Yes





- POLST


Patient has POLST: No





PD ED PE EXPANDED





- General


General: Alert, In Pain





- HEENT


HEENT: Pharynx normal, Dental decay (Teeth in generally poor repair.  Tooth #30 

generally decayed.  There is no gumline swelling or fluctuance.  It is tender to

palpation and light touch.  No drainage.  No trismus.  Normal phonation.  Normal

swallow)





Results





- Vitals


Vitals: 





                               Vital Signs - 24 hr











  23





  19:41


 


Temperature 36.6 C


 


Heart Rate 92


 


Respiratory 16





Rate 


 


Blood Pressure 119/80


 


O2 Saturation 99








                                     Oxygen











O2 Source                      Room air

















PD Medical Decision Making





- ED course


Complexity details: considered differential, d/w patient


ED course: 





32-year-old female presents with acute right lower mouth pain at the site of a 

decayed tooth.  She recently finished a course of clindamycin that was 

prescribed by [facility] but is unable to obtain an appointment until 

May.  She reports an allergy to most of the lidocaine/canes and reports that 

when they do dental work she must be consciously sedated.





Here in the emergency department she has normal phonation and swallow without 

trismus.  On exam of the tooth was decayed and there is tenderness palpation 

clinically I am not suspicious for infection given the lack of gumline swelling,

erythema or drainage. No indication for imaging at the time of today's visit





Patient has tried seemingly reasonable measures for analgesia at home.  I did 

administer a one-time dose of Dilaudid IM here in the emergency department.  And

have prescribed 5 oxycodone tablets on discharge.  She is advised to follow very

closely with [facility].  If in the short-term she should develop any tooth pain 

red flags she will return immediately to the ER.





I am prescribing a short course of short-acting opioid pain medication for this 

patient. I have reviewed the patients  and no concerning findings were 

noted. I have discussed that the opioids are for short term therapy only, and 

will not be refilled from the ED.





Departure





- Departure


Disposition: 01 Home, Self Care


Clinical Impression: 


 Dentalgia





Condition: Stable


Record reviewed to determine appropriate education?: Yes


Instructions:  ED Tooth Pain


Prescriptions: 


Chlorhexidine Gluconate [Peridex] 5 ml MM DAILY #118 ml


oxyCODONE [Roxicodone] 5 mg PO BID #5 tablet


Comments: 


Yanique its important that you follow very closely with the [facility] dental 

clinics to arrange follow-up.  You have an obviously decayed tooth that I am 

certain has an exposed nerve as the cause of your pain.  You can continue to use

 the Anbesol and Orajel.  I sent a prescription for Peridex mouthwash to 

[facility].  Use this once or twice daily.





As I discussed at the bedside being at the tooth and in your mouth I do not 

think that you have a new infection.





If at any point you develop facial swelling, have fevers, inability to swallow 

normally or tolerate your oral secretions you will need to return to the 

emergency department.





I am prescribing a short course of narcotic pain medication for you. These are 

potentially dangerous and addictive medications that should be used carefully.


These medications may constipate you. Take an over-the-counter stool softener 

(docusate) twice daily with plenty of water while taking these medications. If 

you go 24 hours without a bowel movement, take over-the-counter miralax, per 

package instructions.


Do not drink or drive while taking these medications.


If you received narcotic or sedating medications while in the emergency 

department, do not drive for 24 hours.


Store this medication in a safe, secure place and out of reach of children.


It is a violation of federal law to give or sell this medication to another 

person or to use in a manner other than prescribed.


The ED will not refill narcotic prescriptions, including prescriptions lost or 

stolen.


To dispose of unwanted medications:


1. [facility] at [address] in 

[place] has a medication drop box. They accept prescription medications (in 

pill form) Monday through Friday 9:00 a.m. to 5:00 p.m.


2. The [facility] [employer] accepts prescription medications (in

 pill form only) for disposal year round. Call [phone] for more 

information.


3. Contact the [facility] for the next [facility] sponsored prescription 

drug collection event. [phone], [phone], or [phone] 

[phone];


Note that many narcotic pain relievers also contain Tylenol/acetaminophen.  

Please ensure that your total dose of acetaminophen from all sources does not 

exceed 3 g (3000 mg) per day.

## 2023-06-28 ENCOUNTER — HOSPITAL ENCOUNTER (EMERGENCY)
Dept: HOSPITAL 76 - ED | Age: 33
LOS: 1 days | Discharge: HOME | End: 2023-06-29
Payer: SELF-PAY

## 2023-06-28 DIAGNOSIS — K08.89: Primary | ICD-10-CM

## 2023-06-28 DIAGNOSIS — F17.200: ICD-10-CM

## 2023-06-28 DIAGNOSIS — Z91.040: ICD-10-CM

## 2023-06-28 DIAGNOSIS — Z79.899: ICD-10-CM

## 2023-06-28 PROCEDURE — 99282 EMERGENCY DEPT VISIT SF MDM: CPT

## 2023-06-28 PROCEDURE — 99283 EMERGENCY DEPT VISIT LOW MDM: CPT

## 2023-06-29 VITALS — SYSTOLIC BLOOD PRESSURE: 99 MMHG | DIASTOLIC BLOOD PRESSURE: 74 MMHG

## 2023-06-29 NOTE — ED PHYSICIAN DOCUMENTATION
History of Present Illness





- Stated complaint


Stated Complaint: TOOTH PX





- Chief complaint


Chief Complaint: Heent





- History obtained from


History obtained from: Patient





- Additonal information


Additional information: 





The patient comes to the emergency department with chief complaint of dental 

pain.  She states that it is her left mandibular molar that is hurting and she 

has been having ongoing issues with it.  She has severe decay and states she 

knows she needs to see a dentist.  The patient denies fevers or chills.  She 

does state that the whole side of her face hurts and she has noticed a little 

bit of swelling of the gum as well as of her cheek.  No drainage in her mouth.  

No other complaints at this time.





PD PAST MEDICAL HISTORY





- Past Medical History


Cardiovascular: None


Respiratory: None


Neuro: None


Endocrine/Autoimmune: None


GI: None


GYN: Ovarian cysts, Ovarian cancer


: None


HEENT: Chronic hearing loss


Psych: Depression, Anxiety


Musculoskeletal: Other


Derm: None





- Past Surgical History


Past Surgical History: Yes


General: Appendectomy, Other


/GYN:  section, Oophrectomy





- Present Medications


Home Medications: 


                                Ambulatory Orders











 Medication  Instructions  Recorded  Confirmed


 


HYDROcod/ACETAM 5/325 [Norco 5/325] 1 - 2 tablet PO Q6H PRN #14 tablet 22 


 


Albuterol Sulf [Ventolin Hfa 1 - 2 puffs INH Q4HR PRN #1 each 22 





Inhaler]   


 


Benzonatate [Tessalon] 200 mg PO TID PRN #30 cap 22 


 


Oxycodone HCl/Acetaminophen 1 - 2 each PO Q6H PRN #10 tablet 22 





[Percocet 5-325 mg Tablet] MDD 6 tabs  


 


Chlorhexidine Gluconate [Peridex] 5 ml MM DAILY #118 ml 23 


 


oxyCODONE [Roxicodone] 5 mg PO BID #5 tablet 23 


 


HYDROcod/ACETAM 5/325 [Norco 5/325] 1 - 2 tablet PO Q6H PRN #14 tablet 23 


 


clindamycin HCL [Clindamycin HCl] 300 mg PO Q8HR #21 cap 23 














- Allergies


Allergies/Adverse Reactions: 


                                    Allergies











Allergy/AdvReac Type Severity Reaction Status Date / Time


 


adhesive Allergy  Rash Verified 23 23:50


 


Penicillins Allergy  Hives Verified 23 23:50


 


vancomycin Allergy  Rash Verified 23 23:50


 


ketorolac [From Toradol] AdvReac  Headache Verified 23 23:50


 


latex AdvReac  Rash Verified 23 23:50














- Social History


Does the pt smoke?: Yes


Smoking Status: Current every day smoker


Does the pt drink ETOH?: No


Does the pt have substance abuse?: Yes





- Immunizations


Immunizations are current?: Yes





- POLST


Patient has POLST: No





PD ED PE NORMAL





- Vitals


Vital signs reviewed: Yes





- General


General: Alert and oriented X 3, No acute distress, Well developed/nourished





- HEENT


HEENT: Atraumatic, PERRL, EOMI, Moist mucous membranes, Other (Poor dentition, 

tender gingiva surrounding the left mandibular molars.  No fluctuance or mass.  

No buccal edema or mass appreciable. No general facial swelling.)





- Neck


Neck: Supple, no meningeal sign, No adenopathy





- Respiratory


Respiratory: No respiratory distress





- Derm


Derm: Warm and dry





- Extremities


Extremities: No deformity





- Neuro


Neuro: Alert and oriented X 3





- Psych


Psych: Normal mood, Normal affect





Results





- Vitals


Vitals: 


                               Vital Signs - 24 hr











  23





  23:43 01:20


 


Temperature 36.0 C L 


 


Heart Rate 106 H 57 L


 


Respiratory 17 17





Rate  


 


Blood Pressure 128/89 H 99/74


 


O2 Saturation 98 98








                                     Oxygen











O2 Source                      Room air

















PD Medical Decision Making





- ED course


Complexity details: considered differential, d/w patient


ED course: 


The patient was given doses of clindamycin and hydrocodone in the emergency 

department, she does have a penicillin allergy.  She was given prescriptions for

the same.  We have discussed the need for follow-up with a dentist and the usual

indications for return.








Departure





- Departure


Disposition: 01 Home, Self Care


Clinical Impression: 


 Pain, dental





Condition: Stable


Instructions:  ED Tooth Pain


Prescriptions: 


clindamycin HCL [Clindamycin HCl] 300 mg PO Q8HR #21 cap


HYDROcod/ACETAM 5/325 [Norco 5/325] 1 - 2 tablet PO Q6H PRN #14 tablet


 PRN Reason: Pain


Comments: 


Your prescriptions have been electronically transmitted to the [facility] pharmacy 

in [place].  Please pick them up first thing in the morning.


Discharge Date/Time: 23 01:22

## 2023-08-11 ENCOUNTER — HOSPITAL ENCOUNTER (EMERGENCY)
Dept: HOSPITAL 76 - ED | Age: 33
Discharge: HOME | End: 2023-08-11
Payer: SELF-PAY

## 2023-08-11 VITALS — OXYGEN SATURATION: 100 %

## 2023-08-11 VITALS — DIASTOLIC BLOOD PRESSURE: 73 MMHG | SYSTOLIC BLOOD PRESSURE: 111 MMHG

## 2023-08-11 DIAGNOSIS — R31.9: Primary | ICD-10-CM

## 2023-08-11 DIAGNOSIS — F17.200: ICD-10-CM

## 2023-08-11 DIAGNOSIS — K59.00: ICD-10-CM

## 2023-08-11 LAB
ALBUMIN DIAFP-MCNC: 4.7 G/DL (ref 3.2–5.5)
ALBUMIN/GLOB SERPL: 1.7 {RATIO} (ref 1–2.2)
ALP SERPL-CCNC: 71 IU/L (ref 42–121)
ALT SERPL W P-5'-P-CCNC: 10 IU/L (ref 10–60)
ANION GAP SERPL CALCULATED.4IONS-SCNC: 6 MMOL/L (ref 6–13)
AST SERPL W P-5'-P-CCNC: 13 IU/L (ref 10–42)
BASOPHILS NFR BLD AUTO: 0 10^3/UL (ref 0–0.1)
BASOPHILS NFR BLD AUTO: 0.6 %
BILIRUB BLD-MCNC: 0.4 MG/DL (ref 0.2–1)
BUN SERPL-MCNC: 15 MG/DL (ref 6–20)
CALCIUM UR-MCNC: 9.8 MG/DL (ref 8.5–10.3)
CHLORIDE SERPL-SCNC: 102 MMOL/L (ref 101–111)
CLARITY UR REFRACT.AUTO: CLEAR
CO2 SERPL-SCNC: 31 MMOL/L (ref 21–32)
CREAT SERPLBLD-SCNC: 0.8 MG/DL (ref 0.6–1.3)
EOSINOPHIL # BLD AUTO: 0.1 10^3/UL (ref 0–0.7)
EOSINOPHIL NFR BLD AUTO: 1.7 %
ERYTHROCYTE [DISTWIDTH] IN BLOOD BY AUTOMATED COUNT: 12.2 % (ref 12–15)
GFRSERPLBLD MDRD-ARVRAT: 82 ML/MIN/{1.73_M2} (ref 89–?)
GLOBULIN SER-MCNC: 2.7 G/DL (ref 2.1–4.2)
GLUCOSE SERPL-MCNC: 93 MG/DL (ref 74–104)
GLUCOSE UR QL STRIP.AUTO: NEGATIVE MG/DL
HCT VFR BLD AUTO: 42.1 % (ref 37–47)
HGB UR QL STRIP: 14.1 G/DL (ref 12–16)
KETONES UR QL STRIP.AUTO: NEGATIVE MG/DL
LIPASE SERPL-CCNC: 15 U/L (ref 11–82)
LYMPHOCYTES # SPEC AUTO: 3 10^3/UL (ref 1.5–3.5)
LYMPHOCYTES NFR BLD AUTO: 41.9 %
MCH RBC QN AUTO: 32 PG (ref 27–31)
MCHC RBC AUTO-ENTMCNC: 33.5 G/DL (ref 32–36)
MCV RBC AUTO: 95.5 FL (ref 81–99)
MONOCYTES # BLD AUTO: 0.7 10^3/UL (ref 0–1)
MONOCYTES NFR BLD AUTO: 9.2 %
NEUTROPHILS # BLD AUTO: 3.3 10^3/UL (ref 1.5–6.6)
NEUTROPHILS # SNV AUTO: 7.1 X10^3/UL (ref 4.8–10.8)
NEUTROPHILS NFR BLD AUTO: 46.5 %
NITRITE UR QL STRIP.AUTO: NEGATIVE
NRBC # BLD AUTO: 0 /100WBC
NRBC # BLD AUTO: 0 X10^3/UL
PDW BLD AUTO: 9.2 FL (ref 7.9–10.8)
PH UR STRIP.AUTO: 6 PH (ref 5–7.5)
PLATELET # BLD: 218 10^3/UL (ref 130–450)
POTASSIUM SERPL-SCNC: 4.3 MMOL/L (ref 3.5–4.5)
PROT SPEC-MCNC: 7.4 G/DL (ref 6.4–8.9)
PROT UR STRIP.AUTO-MCNC: 30 MG/DL
RBC # UR STRIP.AUTO: (no result) /UL
RBC # URNS HPF: (no result) /HPF (ref 0–5)
RBC MAR: 4.41 10^6/UL (ref 4.2–5.4)
SODIUM SERPLBLD-SCNC: 139 MMOL/L (ref 135–145)
SP GR UR STRIP.AUTO: 1.02 (ref 1–1.03)
SQUAMOUS URNS QL MICRO: (no result)
UROBILINOGEN UR QL STRIP.AUTO: (no result) E.U./DL
UROBILINOGEN UR STRIP.AUTO-MCNC: NEGATIVE MG/DL
WBC # UR MANUAL: (no result) /HPF (ref 0–5)

## 2023-08-11 PROCEDURE — 81003 URINALYSIS AUTO W/O SCOPE: CPT

## 2023-08-11 PROCEDURE — 81001 URINALYSIS AUTO W/SCOPE: CPT

## 2023-08-11 PROCEDURE — 87086 URINE CULTURE/COLONY COUNT: CPT

## 2023-08-11 PROCEDURE — 96374 THER/PROPH/DIAG INJ IV PUSH: CPT

## 2023-08-11 PROCEDURE — 99283 EMERGENCY DEPT VISIT LOW MDM: CPT

## 2023-08-11 PROCEDURE — 83690 ASSAY OF LIPASE: CPT

## 2023-08-11 PROCEDURE — 36415 COLL VENOUS BLD VENIPUNCTURE: CPT

## 2023-08-11 PROCEDURE — 85025 COMPLETE CBC W/AUTO DIFF WBC: CPT

## 2023-08-11 PROCEDURE — 96375 TX/PRO/DX INJ NEW DRUG ADDON: CPT

## 2023-08-11 PROCEDURE — 80053 COMPREHEN METABOLIC PANEL: CPT

## 2023-08-11 NOTE — ED PHYSICIAN DOCUMENTATION
History of Present Illness





- Stated complaint


Stated Complaint: FEMALE ,BACK AND ABD PX





- Chief complaint


Chief Complaint: UTI





- History obtained from


History obtained from: Patient





- Additonal information


Additional information: 





33yoF with PMH ovarian cancer (remission since ) presents for L sided flank 

pain, dysuria, and hematuria x 2 days. States that tonight her pain was worse 

with blood clots in her urine. She states that she lost control of her bladder, 

so she decided to present for evaluation. This has never happened previously. 

Reports back pain earlier this week, however currently her pain is located on 

the L hand side of her back over her flank.





Review of Systems


Constitutional: denies: Fever, Chills


GI: reports: Nausea.  denies: Abdominal Pain, Vomiting, Constipation


: reports: Dysuria, Hematuria.  denies: Frequency, Hesitancy, Unable to Void


Musculoskeletal: reports: Back pain.  denies: Neck pain, Extremity pain, Joint 

pain


Neurologic: denies: Generalized weakness, Focal weakness, Numbness





PD PAST MEDICAL HISTORY





- Past Medical History


Cardiovascular: None


Respiratory: None


Neuro: None


Endocrine/Autoimmune: None


GI: None


GYN: Ovarian cysts, Ovarian cancer


: None


HEENT: Chronic hearing loss


Psych: Depression, Anxiety


Musculoskeletal: Other


Derm: None





- Past Surgical History


Past Surgical History: Yes


General: Appendectomy, Other


/GYN:  section, Oophrectomy





- Present Medications


Home Medications: 


                                Ambulatory Orders











 Medication  Instructions  Recorded  Confirmed


 


HYDROcod/ACETAM 5/325 [Norco 5/325] 1 - 2 tablet PO Q6H PRN #14 tablet 22 


 


Albuterol Sulf [Ventolin Hfa 1 - 2 puffs INH Q4HR PRN #1 each 22 





Inhaler]   


 


Benzonatate [Tessalon] 200 mg PO TID PRN #30 cap 22 


 


Oxycodone HCl/Acetaminophen 1 - 2 each PO Q6H PRN #10 tablet 22 





[Percocet 5-325 mg Tablet] MDD 6 tabs  


 


Chlorhexidine Gluconate [Peridex] 5 ml MM DAILY #118 ml 23 


 


oxyCODONE [Roxicodone] 5 mg PO BID #5 tablet 23 


 


HYDROcod/ACETAM 5/325 [Norco 5/325] 1 - 2 tablet PO Q6H PRN #14 tablet 23 


 


clindamycin HCL [Clindamycin HCl] 300 mg PO Q8HR #21 cap 23 


 


Metoclopramide [Reglan] 10 mg PO Q6H PRN #20 tablet 23 














- Allergies


Allergies/Adverse Reactions: 


                                    Allergies











Allergy/AdvReac Type Severity Reaction Status Date / Time


 


Penicillins Allergy Severe Hives Verified 23 03:42


 


vancomycin Allergy Severe Rash Verified 23 03:42


 


adhesive Allergy  Rash Verified 23 03:42


 


ketorolac [From Toradol] AdvReac Severe Headache Verified 23 03:42


 


latex AdvReac  Rash Verified 23 03:42














- Social History


Does the pt smoke?: Yes


Smoking Status: Current every day smoker


Does the pt drink ETOH?: No


Does the pt have substance abuse?: Yes





- Immunizations


Immunizations are current?: Yes





- POLST


Patient has POLST: No





PD ED PE NORMAL





- Vitals


Vital signs reviewed: Yes





- General


General: Alert and oriented X 3, No acute distress, Well developed/nourished





- HEENT


HEENT: Atraumatic





- Neck


Neck: Supple, no meningeal sign





- Cardiac


Cardiac: RRR, No murmur, Strong equal pulses





- Respiratory


Respiratory: No respiratory distress, Clear bilaterally





- Abdomen


Abdomen: Soft, Non tender, Non distended





- Female 


Female : Deferred





- Back


Back: No spinal TTP, Other (No midline tenderness, L sided CVA tenderness to 

palpation)





- Derm


Derm: Normal color, Warm and dry, No rash





- Extremities


Extremities: No deformity, No tenderness to palpate, Normal ROM s pain, No edema





- Neuro


Neuro: Alert and oriented X 3, CNs 2-12 intact, No motor deficit, Normal speech





- Psych


Psych: Normal mood, Normal affect





Results





- Vitals


Vitals: 


                               Vital Signs - 24 hr











  23





  03:25 05:57


 


Temperature 36.8 C 


 


Heart Rate 85 53 L


 


Respiratory 16 16





Rate  


 


Blood Pressure 112/70 111/73


 


O2 Saturation 100 100








                                     Oxygen











O2 Source                      Room air

















- Labs


Labs: 


                                Laboratory Tests











  23





  03:39 03:45 03:45


 


WBC   7.1 


 


RBC   4.41 


 


Hgb   14.1 


 


Hct   42.1 


 


MCV   95.5 


 


MCH   32.0 H 


 


MCHC   33.5 


 


RDW   12.2 


 


Plt Count   218 


 


MPV   9.2 


 


Neut # (Auto)   3.3 


 


Lymph # (Auto)   3.0 


 


Mono # (Auto)   0.7 


 


Eos # (Auto)   0.1 


 


Baso # (Auto)   0.0 


 


Absolute Nucleated RBC   0.00 


 


Nucleated RBC %   0.0 


 


Sodium    139


 


Potassium    4.3


 


Chloride    102


 


Carbon Dioxide    31


 


Anion Gap    6.0


 


BUN    15


 


Creatinine    0.8


 


Estimated GFR (MDRD)    82 L


 


Glucose    93


 


Calcium    9.8


 


Total Bilirubin    0.4


 


AST    13


 


ALT    10


 


Alkaline Phosphatase    71


 


Total Protein    7.4


 


Albumin    4.7


 


Globulin    2.7


 


Albumin/Globulin Ratio    1.7


 


Lipase    15


 


Urine Color  YELLOW  


 


Urine Clarity  CLEAR  


 


Urine pH  6.0  


 


Ur Specific Gravity  1.025  


 


Urine Protein  30 H  


 


Urine Glucose (UA)  NEGATIVE  


 


Urine Ketones  NEGATIVE  


 


Urine Occult Blood  MODERATE H  


 


Urine Nitrite  NEGATIVE  


 


Urine Bilirubin  NEGATIVE  


 


Urine Urobilinogen  0.2 (NORMAL)  


 


Ur Leukocyte Esterase  NEGATIVE  


 


Urine RBC  6-10 H  


 


Urine WBC  0-3  


 


Ur Squamous Epith Cells  FEW Squamous  


 


Urine Bacteria  Rare  


 


Ur Microscopic Review  INDICATED  


 


Urine Culture Comments  NOT INDICATED  














PD Medical Decision Making





- ED course


Complexity details: reviewed old records, reviewed results, re-evaluated 

patient, considered differential, d/w patient


ED course: 





Nontoxic-appearing patient with flank pain and hematuria.  Physical exam is 

relatively benign, abdomen is soft and nontender, concern is for possible renal 

stone given colicky nature of patient's pain and hematuria.  Patient allergic to

Toradol, will give 1 dose of morphine and IV fluids.








Laboratory work is unremarkable.  Some RBCs and occult blood noted on urinalysis

without signs of infection.  CT scan shows large stool burden, no evidence of 

stone, no hydro-.  There are some dilation of the stomach and small bowel 

concerning for possible SMA syndrome, however patient has no abdominal 

complaints and is concerned with her hematuria and her flank pain.  This is 

likely incidental.  Patient was counseled on the results of her labs and 

imaging, I explained that I do not know the exact cause of her flank pain, 

however it is likely that her large stool burden is increasing her pain on the 

left-hand side.  There is no obvious stone, however that does not rule out 

previous passage of stone with residual blood.  Patient was counseled to follow-

up with urology if she continues to have hematuria.  She was counseled on stool 

softeners and Tylenol and Motrin at home as needed for pain.  Patient requested 

medication for pain at home, I gave her a very short course of Percocets and 

advised that narcotics will increase risk of constipation and potentially worsen

her flank pain.  Patient expressed understanding of plan and is in agreement at 

this time.  All questions answered at time of discharge.





Departure





- Departure


Disposition: 01 Home, Self Care


Clinical Impression: 


Hematuria


Qualifiers:


 Hematuria type: unspecified type Qualified Code(s): R31.9 - Hematuria, 

unspecified





Constipation


Qualifiers:


 Constipation type: unspecified constipation type Qualified Code(s): K59.00 - C

onstipation, unspecified





Condition: Stable


Instructions:  ED Constipation, ED Hematuria


Follow-Up: 


Arnold Marquez MD [Provider Admit Priv/Credential] - 


Prescriptions: 


Metoclopramide [Reglan] 10 mg PO Q6H PRN #20 tablet


 PRN Reason: nausea or headache


Discharge Date/Time: 23 05:59

## 2024-02-14 ENCOUNTER — HOSPITAL ENCOUNTER (EMERGENCY)
Dept: HOSPITAL 76 - ED | Age: 34
LOS: 1 days | Discharge: HOME | End: 2024-02-15
Payer: MEDICAID

## 2024-02-14 DIAGNOSIS — K52.9: Primary | ICD-10-CM

## 2024-02-14 DIAGNOSIS — Z91.040: ICD-10-CM

## 2024-02-14 DIAGNOSIS — F17.200: ICD-10-CM

## 2024-02-14 PROCEDURE — 96374 THER/PROPH/DIAG INJ IV PUSH: CPT

## 2024-02-14 PROCEDURE — 99283 EMERGENCY DEPT VISIT LOW MDM: CPT

## 2024-02-14 PROCEDURE — 96375 TX/PRO/DX INJ NEW DRUG ADDON: CPT

## 2024-02-14 PROCEDURE — 87633 RESP VIRUS 12-25 TARGETS: CPT

## 2024-02-14 RX ADMIN — ONDANSETRON STA MG: 4 TABLET, ORALLY DISINTEGRATING ORAL at 22:43

## 2024-02-14 RX ADMIN — SODIUM CHLORIDE STA MLS/HR: 9 INJECTION, SOLUTION INTRAVENOUS at 22:51

## 2024-02-14 RX ADMIN — HYDROMORPHONE HYDROCHLORIDE STA MG: 1 INJECTION, SOLUTION INTRAMUSCULAR; INTRAVENOUS; SUBCUTANEOUS at 22:50

## 2024-02-14 RX ADMIN — DROPERIDOL STA MG: 2.5 INJECTION, SOLUTION INTRAMUSCULAR; INTRAVENOUS at 22:51

## 2024-02-14 NOTE — ED PHYSICIAN DOCUMENTATION
PD HPI NVD





- Stated complaint


Stated Complaint: VOMIT/BODY ACHES





- Chief complaint


Chief Complaint: Abd Pain





- History obtained from


History obtained from: Patient





- Additonal information


Additional information: 





The pt comes to the ED with CC of aches, congestion, nausea, and vomiting over 

the past day.  She denies any specific abdominal pain.  No CP or SOB.  No fevers

that she knows of.  Pt states she has not known of any sick contacts.  She is 

otherwise fairly healthy, she thinks.





PD PAST MEDICAL HISTORY





- Past Medical History


Past Medical History: Yes


Cardiovascular: None


Respiratory: None


Neuro: None


Endocrine/Autoimmune: None


GI: None


GYN: Ovarian cysts, Ovarian cancer


: None


HEENT: Chronic hearing loss


Psych: Depression, Anxiety


Musculoskeletal: Other


Derm: None





- Past Surgical History


Past Surgical History: Yes


General: Appendectomy, Other


/GYN:  section, Oophrectomy





- Present Medications


Home Medications: 


                                Ambulatory Orders











 Medication  Instructions  Recorded  Confirmed


 


Ondansetron Odt [Zofran] 4 mg TL Q6H PRN #10 tablet 02/15/24 














- Allergies


Allergies/Adverse Reactions: 


                                    Allergies











Allergy/AdvReac Type Severity Reaction Status Date / Time


 


Penicillins Allergy Severe Hives Verified 24 23:01


 


vancomycin Allergy Severe Rash Verified 24 23:01


 


adhesive Allergy  Rash Verified 24 23:01


 


ketorolac [From Toradol] AdvReac Severe Headache Verified 24 23:01


 


latex AdvReac  Rash Verified 24 23:01














- Social History


Does the pt smoke?: Yes


Smoking Status: Current every day smoker


Does the pt drink ETOH?: No


Does the pt have substance abuse?: Yes





- Immunizations


Immunizations are current?: Yes





- POLST


Patient has POLST: No





PD ED PE NORMAL





- Vitals


Vital signs reviewed: Yes





- General


General: Alert and oriented X 3, No acute distress, Well developed/nourished





- HEENT


HEENT: Atraumatic, PERRL, EOMI, Moist mucous membranes





- Neck


Neck: Supple, no meningeal sign





- Cardiac


Cardiac: RRR, No murmur





- Respiratory


Respiratory: No respiratory distress, Clear bilaterally





- Abdomen


Abdomen: Soft, Non tender, Non distended





- Derm


Derm: Normal color, Warm and dry, No rash





- Extremities


Extremities: No deformity, No edema





- Neuro


Neuro: Alert and oriented X 3





- Psych


Psych: Normal mood, Normal affect





Results





- Vitals


Vitals: 


                                     Oxygen











O2 Source                      Room air

















- Labs


Labs: 


                                Laboratory Tests











  24





  23:15


 


Nasal Adenovirus (PCR)  NOT DETECTED


 


Nasal B. parapertussis DNA (PCR)  NOT DETECTED


 


Nasal Coronavir 229E PCR  NOT DETECTED


 


Nasal Coronavir HKU1 PCR  NOT DETECTED


 


Nasal Coronavir NL63 PCR  NOT DETECTED


 


Nasal Coronavir OC43 PCR  NOT DETECTED


 


Nasal Enterovir/Rhinovir PCR  NOT DETECTED


 


Nasal Influenza B PCR  NOT DETECTED


 


Nasal Influenza A PCR  NOT DETECTED


 


Nasal Parainfluen 1 PCR  NOT DETECTED


 


Nasal Parainfluen 2 PCR  NOT DETECTED


 


Nasal Parainfluen 3 PCR  NOT DETECTED


 


Nasal Parainfluen 4 PCR  NOT DETECTED


 


Nasal RSV (PCR)  NOT DETECTED


 


Nasal B.pertussis DNA PCR  NOT DETECTED


 


Nasal C.pneumoniae (PCR)  NOT DETECTED


 


Ji Human Metapneumo PCR  NOT DETECTED


 


Nasal M.pneumoniae (PCR)  NOT DETECTED


 


Nasal SARS-CoV-2 (PCR)  NOT DETECTED














PD Medical Decision Making





- ED course


Complexity details: reviewed results, re-evaluated patient, considered 

differential, d/w patient


ED course: 





The pt was treated symptomatically with IV fluids, Zofran, Droperidol, Dilaudid,

and Tylenol.  She was found to be feeling better.  The pt had a low-grade fever 

here, and I suspected a viral illness.  Her exam was otherwise benign.  I sent a

PCR panel, which was negative.  We have discussed symptomatic management at 

home, as well as the usual indications for return.





Departure





- Departure


Disposition: 01 Home, Self Care


Clinical Impression: 


 Gastroenteritis





Condition: Stable


Instructions:  ED Gastroenteritis Viral


Prescriptions: 


Ondansetron Odt [Zofran] 4 mg TL Q6H PRN #10 tablet


 PRN Reason: Nausea / Vomiting


Comments: 


Your symptoms are consistent with the viral illnesses that have been going 

around recently and causing vomiting and diarrhea.  We do not have specific 

tests for most of these.  The viral panel that we did today is negative for the 

things we can test for.  You were treated with IV fluids and antinausea 

medicine.  You had a low fever here and were treated for that as well.  At this 

point in time, your abdominal exam is benign and there is no evidence of a more 

serious infectious condition with in the abdomen.  A prescription for nausea 

medication has been electronically transmitted to the [facility] pharmacy in [place].  If you feel that your symptoms are worsening over the next few days, or

 if you develop worsening abdominal pain, please seek medical reevaluation.


Forms:  PCP List


Discharge Date/Time: 02/15/24 00:52

## 2024-02-15 VITALS — DIASTOLIC BLOOD PRESSURE: 60 MMHG | SYSTOLIC BLOOD PRESSURE: 98 MMHG | OXYGEN SATURATION: 99 %

## 2024-02-15 LAB
B PARAPERT DNA SPEC QL NAA+PROBE: NOT DETECTED
B PERT DNA SPEC QL NAA+PROBE: NOT DETECTED
C PNEUM DNA NPH QL NAA+NON-PROBE: NOT DETECTED
FLUAV RNA RESP QL NAA+PROBE: NOT DETECTED
HAEM INFLU B DNA SPEC QL NAA+PROBE: NOT DETECTED
HCOV 229E RNA SPEC QL NAA+PROBE: NOT DETECTED
HCOV HKU1 RNA UPPER RESP QL NAA+PROBE: NOT DETECTED
HCOV NL63 RNA ASPIRATE QL NAA+PROBE: NOT DETECTED
HCOV OC43 RNA SPEC QL NAA+PROBE: NOT DETECTED
HMPV AG SPEC QL: NOT DETECTED
HPIV1 RNA NPH QL NAA+PROBE: NOT DETECTED
HPIV2 SPEC QL CULT: NOT DETECTED
HPIV3 AB TITR SER CF: NOT DETECTED {TITER}
HPIV4 RNA SPEC QL NAA+PROBE: NOT DETECTED
M PNEUMO DNA SPEC QL NAA+PROBE: NOT DETECTED
RSV RNA RESP QL NAA+PROBE: NOT DETECTED
RV+EV RNA SPEC QL NAA+PROBE: NOT DETECTED
SARS-COV-2 RNA PNL SPEC NAA+PROBE: NOT DETECTED

## 2024-02-15 RX ADMIN — ACETAMINOPHEN STA MG: 325 TABLET ORAL at 00:37

## 2024-02-16 ENCOUNTER — HOSPITAL ENCOUNTER (OUTPATIENT)
Dept: HOSPITAL 76 - EMS | Age: 34
End: 2024-02-16
Payer: MEDICAID
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
## 2024-02-16 DIAGNOSIS — S61.211A: Primary | ICD-10-CM

## 2024-02-22 ENCOUNTER — HOSPITAL ENCOUNTER (EMERGENCY)
Dept: HOSPITAL 76 - ED | Age: 34
Discharge: HOME | End: 2024-02-22
Payer: MEDICAID

## 2024-02-22 ENCOUNTER — HOSPITAL ENCOUNTER (OUTPATIENT)
Dept: HOSPITAL 76 - EMS | Age: 34
Discharge: ANOTHER HOSPITAL | End: 2024-02-22
Payer: MEDICAID

## 2024-02-22 VITALS — OXYGEN SATURATION: 97 % | SYSTOLIC BLOOD PRESSURE: 142 MMHG | DIASTOLIC BLOOD PRESSURE: 78 MMHG
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
## 2024-02-22 DIAGNOSIS — M79.18: ICD-10-CM

## 2024-02-22 DIAGNOSIS — S30.0XXA: ICD-10-CM

## 2024-02-22 DIAGNOSIS — F17.200: ICD-10-CM

## 2024-02-22 DIAGNOSIS — M79.605: Primary | ICD-10-CM

## 2024-02-22 DIAGNOSIS — M79.89: Primary | ICD-10-CM

## 2024-02-22 PROCEDURE — 99284 EMERGENCY DEPT VISIT MOD MDM: CPT

## 2024-02-22 PROCEDURE — 73502 X-RAY EXAM HIP UNI 2-3 VIEWS: CPT

## 2024-02-22 PROCEDURE — 99283 EMERGENCY DEPT VISIT LOW MDM: CPT

## 2024-02-22 PROCEDURE — 93971 EXTREMITY STUDY: CPT

## 2024-02-22 RX ADMIN — HYDROCODONE BITARTRATE AND ACETAMINOPHEN STA TAB: 5; 325 TABLET ORAL at 11:55

## 2024-02-22 RX ADMIN — IBUPROFEN STA MG: 600 TABLET, FILM COATED ORAL at 11:45

## 2024-02-22 NOTE — XRAY REPORT
PROCEDURE:  Hip w/Pelvis 2-3V LT

 

INDICATIONS:  fall last week, pain/swelling

 

TECHNIQUE:  2 views of the hip were acquired.  

 

COMPARISON:  CT of the abdomen and pelvis dated 8/11/2023.

 

FINDINGS:  

 

Bones:  No acute fractures or dislocations.  A small corticated bone fragment is present along the cordova
perior aspect of the left acetabulum. This is unchanged when compared with the CT of the abdomen and 
pelvis dated 8/11/2023. No suspicious bony lesions.   

 

Soft tissues:  No suspicious soft tissue calcifications or masses.  

 

 

IMPRESSION:  

No acute bony abnormality. If there remains a high clinical concern for fracture, including inability
 to bear weight, consider cross-sectional imaging to exclude an occult fracture.

 

 

 

Reviewed by: Tasha Godwin MD on 2/22/2024 10:57 AM PST

Approved by: Tasha Godwin MD on 2/22/2024 10:57 AM [facility]

 

 

Station ID:  [id number]

## 2024-02-22 NOTE — ED PHYSICIAN DOCUMENTATION
PD HPI LOWER EXT INJURY





- Stated complaint


Stated Complaint: R/O DVT





- Chief complaint


Chief Complaint: Ext Problem





- History obtained from


History obtained from: Patient, EMS





- Additional information


Additional information: 


Patient is brought to the emergency department by EMS for chief complaint of 

pain in left buttock and thigh, escalating over the last week.  The patient was 

seen here in our emergency department by myself for gastroenteritis and states 

that the next day, she got lightheaded and had a syncopal episode.  She states 

when she woke up, she felt a pain in her left buttock that seem like an ache, 

almost like somebody had punched her.  She states that it has intensified 

somewhat in the days since.  She was seen in walk-in clinic today and PA sent 

her here for concerns over possible DVT.  Patient states she has noticed some 

swelling in her left lower extremity that seems to be mostly in her thigh.  She 

states it is noticeably bigger than the other side.  No history of DVT.  She was

not having any symptoms prior to last week.  She has been walking around and 

also states that her gastroenteritis has resolved so she has been much more 

active and eating.








PD PAST MEDICAL HISTORY





- Past Medical History


Past Medical History: Yes


Cardiovascular: None


Respiratory: None


Neuro: None


Endocrine/Autoimmune: None


GI: None


GYN: Ovarian cysts, Ovarian cancer


: None


HEENT: Chronic hearing loss


Psych: Depression, Anxiety


Musculoskeletal: Other


Derm: None





- Past Surgical History


Past Surgical History: Yes


General: Appendectomy, Other


/GYN:  section, Oophrectomy





- Present Medications


Home Medications: 


                                Ambulatory Orders











 Medication  Instructions  Recorded  Confirmed


 


buPROPion [Wellbutrin Sr] 150 mg PO DAILY 24














- Allergies


Allergies/Adverse Reactions: 


                                    Allergies











Allergy/AdvReac Type Severity Reaction Status Date / Time


 


Penicillins Allergy Severe Hives Verified 24 10:30


 


vancomycin Allergy Severe Rash Verified 24 10:30


 


adhesive Allergy  Rash Verified 24 10:30


 


ketorolac [From Toradol] AdvReac Severe Headache Verified 24 10:30


 


latex AdvReac  Rash Verified 24 10:30














- Social History


Does the pt smoke?: Yes


Smoking Status: Current every day smoker


Does the pt drink ETOH?: No


Does the pt have substance abuse?: Yes





- Immunizations


Immunizations are current?: Yes





- POLST


Patient has POLST: No





PD ED PE NORMAL





- Vitals


Vital signs reviewed: Yes





- General


General: Alert and oriented X 3, No acute distress, Well developed/nourished





- HEENT


HEENT: Atraumatic, PERRL, Moist mucous membranes





- Neck


Neck: Supple, no meningeal sign





- Cardiac


Cardiac: Strong equal pulses





- Respiratory


Respiratory: No respiratory distress





- Derm


Derm: Normal color, Warm and dry, No rash, Other (No contusion left buttock.)





- Extremities


Extremities: No deformity, Other (Moderate enlargement/edema of left thigh 

compared to right.  Tenderness to palpation over left SI joint and extending 

through left upper leg.  No calf tenderness or enlargement.)





- Neuro


Neuro: Alert and oriented X 3, Other (Grossly intact.)





- Psych


Psych: Normal mood, Normal affect





Results





- Vitals


Vitals: 





                               Vital Signs - 24 hr











  24





  10:26


 


Temperature 36.0 C L


 


Heart Rate 95


 


Respiratory 20





Rate 


 


Blood Pressure 144/96 H


 


O2 Saturation 99








                                     Oxygen











O2 Source                      Room air

















- Rads (name of study)


  ** Left hip and pelvis x-ray series


Relevant Findings:: Final report received, See rad report (Negative)





  ** Left lower extremity venous duplex ultrasound


Relevant Findings:: Final report received, See rad report (Negative)





PD Medical Decision Making





- ED course


Complexity details: reviewed results, re-evaluated patient, considered 

differential, d/w patient


ED course: 


The patient was worked up with x-ray of the left hip and pelvis and with 

ultrasound left lower extremity, both of which were unremarkable.  I felt the 

patient was stable for discharge home.  I discussed with her that she is most 

likely bruised the tissues around her left buttock and possibly sprain the 

joints in the area to a certain degree.  The patient is ambulatory and there is 

no evidence of a more serious injury and this point in time, she is stable for 

discharge home.  We have discussed symptomatic management at home as well as 

usual indications for return.








Departure





- Departure


Disposition: 01 Home, Self Care


Clinical Impression: 


 Contusion of left buttock





Pain of lower extremity


Qualifiers:


 Laterality: left Qualified Code(s): M79.605 - Pain in left leg





Condition: Stable


Instructions:  ED Strain Muscle Ext, ED Contusion Lower Ext


Comments: 


Both your x-ray series and your ultrasound look good.  There is no evidence of a

fracture, dislocation, or blood clot.  Most likely, you have bruised the tissues

of your left buttock including muscle and possibly strained some of the joint 

connections between the pelvis and your low spine.  In general, these sorts of 

injuries do get better on their own, given time.  The swelling is most likely 

inflammation from the injury and will clear up on its own.  You may take 

ibuprofen and Tylenol over-the-counter as needed.  You may walk around is much 

as you can tolerate, though you should take time to rest the area and stretch.  

Please follow-up with your primary care physician for further concerns.

## 2024-02-22 NOTE — ULTRASOUND REPORT
PROCEDURE:  Duplex Venous Limited

 

INDICATIONS:  pain/swelling LLE, worsening x 1 week

 

TECHNIQUE:  

Real-time imaging, as well as color and pulse Doppler interrogation, were performed of the lower extr
emity deep veins from the inguinal ligament to the popliteal fossa. Attempted visualization of the ca
lf veins was performed. 

 

COMPARISON:  None.

 

FINDINGS:  The deep veins are normally compressible, and free of intraluminal thrombus.  Color and pu
lse Doppler demonstrate normal phasic intraluminal flow.  There is normal augmentation response to di
stal compression maneuver.  

 

IMPRESSION:  No deep venous thrombosis of the visualized lower extremity.

 

Reviewed by: Tasha Godwin MD on 2/22/2024 12:19 PM PST

Approved by: Tasha Godwin MD on 2/22/2024 12:19 PM PST

 

 

Station ID:  [id number]

## 2024-06-18 ENCOUNTER — HOSPITAL ENCOUNTER (EMERGENCY)
Dept: HOSPITAL 76 - ED | Age: 34
LOS: 1 days | Discharge: HOME | End: 2024-06-19
Payer: MEDICAID

## 2024-06-18 DIAGNOSIS — L24.9: Primary | ICD-10-CM

## 2024-06-18 DIAGNOSIS — F17.200: ICD-10-CM

## 2024-06-18 PROCEDURE — 99282 EMERGENCY DEPT VISIT SF MDM: CPT

## 2024-06-18 PROCEDURE — 99283 EMERGENCY DEPT VISIT LOW MDM: CPT

## 2024-06-19 VITALS — DIASTOLIC BLOOD PRESSURE: 80 MMHG | OXYGEN SATURATION: 100 % | SYSTOLIC BLOOD PRESSURE: 119 MMHG

## 2024-06-19 NOTE — ED PHYSICIAN DOCUMENTATION
History of Present Illness





- Stated complaint


Stated Complaint: L FOOT PX/RASH





- Chief complaint


Chief Complaint: Ext Problem





- History obtained from


History obtained from: Patient





- Additonal information


Additional information: 





HPI from patient.


Patient complains of approximately 2 weeks of bilateral lower leg erythema, 

predominantly left-sided.  There was no inciting event, and the patient has not 

noted any exacerbating nor ameliorating factors. She describes mild burning 

discomfort at the site of the rash. 





Review of Systems


Constitutional: reports: Reviewed and negative


Skin: reports: Rash





PD PAST MEDICAL HISTORY





- Past Medical History


Past Medical History: Yes


Cardiovascular: None


Respiratory: None


Neuro: None


Endocrine/Autoimmune: None


GI: None


GYN: Ovarian cysts, Ovarian cancer


: None


HEENT: Chronic hearing loss


Psych: Depression, Anxiety


Musculoskeletal: Other


Derm: None





- Past Surgical History


Past Surgical History: Yes


General: Appendectomy, Other


/GYN:  section, Oophrectomy





- Present Medications


Home Medications: 


                                Ambulatory Orders











 Medication  Instructions  Recorded  Confirmed


 


buPROPion [Wellbutrin Sr] 150 mg PO DAILY 24


 


Hydrocortisone Valerate 1 film TP BID #45 gm 24 














- Allergies


Allergies/Adverse Reactions: 


                                    Allergies











Allergy/AdvReac Type Severity Reaction Status Date / Time


 


Penicillins Allergy Severe Hives Verified 24 00:09


 


vancomycin Allergy Severe Rash Verified 24 00:09


 


adhesive Allergy  Rash Verified 24 00:09


 


ketorolac [From Toradol] AdvReac Severe Headache Verified 24 00:09


 


latex AdvReac  Rash Verified 24 00:09














- Social History


Does the pt smoke?: Yes


Smoking Status: Current every day smoker


Does the pt drink ETOH?: No


Does the pt have substance abuse?: Yes





- Immunizations


Immunizations are current?: Yes





- POLST


Patient has POLST: No





PD ED PE NORMAL





- Vitals


Vital signs reviewed: Yes





- General


General: Alert and oriented X 3, No acute distress, Well developed/nourished, 

Other (initially sleeping, wakens to verbal when combined with gentle tactile 

stimulus (shoulder shake))





PD ED PE EXPANDED





- Extremities


ISAAC LE visual: 


                            __________________________














                            __________________________





 1 - rash (flat patchy erythema with serpiginous borders that are poorly 

demarcated)





 2 - rash (flat patchy erythema with serpiginous borders that are poorly 

demarcated. nontender, not hot to touch)





 3 - rash (circumferential continuation of rash labelled "2": flat patchy 

erythema with serpiginous borders that are poorly demarcated. nontender, not hot

to touch)








Results





- Vitals


Vitals: 


                               Vital Signs - 24 hr











  24





  00:07


 


Temperature 36.2 C L


 


Heart Rate 96


 


Respiratory 18





Rate 


 


Blood Pressure 119/80


 


O2 Saturation 100








                                     Oxygen











O2 Source                      Room air

















PD Medical Decision Making





- ED course


Complexity details: considered differential, d/w patient


ED course: 





Etiology of patient's rash is unclear at this time.  The appearance is 

suggestive of a contact/irritant dermatitis, although she cannot think of 

anything that would have been contacting the area of rash on a recurrent basis. 

I have electronically submitted a prescription for Westcort (hydrocortisone 

valerate) to patient's pharmacy of choice as empiric treatment for possible 

contact/irritant dermatitis.


The characteristics of the rash do not support an infectious cause such as 

cellulitis (borders are poorly demarcated, no abnormal warmth/heat to touch, and

the bilateral nature of the lesions would be particularly atypical).


I advised her to seek follow-up/reevaluation with PCP if the rash does not 

resolve within 1-2 weeks of treatment with the prescribed steroid. Return 

precautions reviewed





Departure





- Departure


Disposition: 01 Home, Self Care


Clinical Impression: 


 Irritant dermatitis





Condition: Good


Instructions:  ED Dermatitis Contact


Prescriptions: 


Hydrocortisone Valerate 1 film TP BID #45 gm


Comments: 


I have electronically submitted a prescription for a topical steroid to [facility] 

pharmacy in [place].  Apply thin film of the steroid to the affected areas on

both of your legs twice per day for up to 2 weeks (can stop before 2 weeks if 

the redness resolves completely).  If the symptoms have not completely resolved 

after two weeks of use of the topical steroid, you should follow-up with your 

primary care provider.


Discharge Date/Time: 24 02:01

## 2024-09-21 ENCOUNTER — HOSPITAL ENCOUNTER (EMERGENCY)
Dept: HOSPITAL 76 - ED | Age: 34
Discharge: HOME | End: 2024-09-21
Payer: MEDICAID

## 2024-09-21 VITALS — OXYGEN SATURATION: 99 %

## 2024-09-21 VITALS — SYSTOLIC BLOOD PRESSURE: 117 MMHG | DIASTOLIC BLOOD PRESSURE: 81 MMHG

## 2024-09-21 DIAGNOSIS — S20.219A: Primary | ICD-10-CM
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
## 2024-09-21 PROCEDURE — 71100 X-RAY EXAM RIBS UNI 2 VIEWS: CPT

## 2024-09-21 PROCEDURE — 99283 EMERGENCY DEPT VISIT LOW MDM: CPT

## 2024-09-21 RX ADMIN — OXYCODONE STA MG: 5 TABLET ORAL at 02:06

## 2024-09-21 RX ADMIN — ACETAMINOPHEN STA MG: 500 TABLET ORAL at 02:07

## 2024-09-21 RX ADMIN — HYDROCODONE BITARTRATE AND ACETAMINOPHEN STA BOTTLE: 5; 325 TABLET ORAL at 03:02

## 2024-09-21 NOTE — ED PHYSICIAN DOCUMENTATION
History of Present Illness





- Stated complaint


Stated Complaint: LT RIB PX





- Chief complaint


Chief Complaint: Trauma Ch/Bk





- History obtained from


History obtained from: Patient, Family





- Additonal information


Additional information: 





34-year-old female presents by private vehicle from home for left-sided chest 

wall pain.  Earlier this evening she was on the deck and she states that her 

blind dog bumped into her, causing her to slip and landed on her left side.  She

has bruising and swelling over her left lateral chest wall.  No medications 

taken prior to arrival.





Review of Systems


Constitutional: denies: Fever, Chills


Cardiac: reports: Chest pain / pressure (chest wall).  denies: Palpitations, 

Calf pain


Respiratory: denies: Dyspnea, Cough, Wheezing


Musculoskeletal: denies: Neck pain, Back pain, Extremity pain, Joint pain


Neurologic: denies: Generalized weakness, Focal weakness, Numbness, Difficulty 

speaking





PD PAST MEDICAL HISTORY





- Past Medical History


Past Medical History: Yes


Cardiovascular: None


Respiratory: None


Neuro: None


Endocrine/Autoimmune: None


GI: None


GYN: Ovarian cysts, Ovarian cancer


: None


HEENT: Chronic hearing loss


Psych: Depression, Anxiety


Musculoskeletal: Other


Derm: None





- Past Surgical History


Past Surgical History: Yes


General: Appendectomy, Other


/GYN:  section, Oophrectomy





- Present Medications


Home Medications: 


                                Ambulatory Orders











 Medication  Instructions  Recorded  Confirmed


 


No Known Home Medications  24














- Allergies


Allergies/Adverse Reactions: 


                                    Allergies











Allergy/AdvReac Type Severity Reaction Status Date / Time


 


Penicillins Allergy Severe Hives Verified 24 00:50


 


vancomycin Allergy Severe Rash Verified 24 00:50


 


adhesive Allergy  Rash Verified 24 00:50


 


ketorolac [From Toradol] AdvReac Severe Headache Verified 24 00:50


 


latex AdvReac  Rash Verified 24 00:50














- Social History


Does the pt smoke?: Yes


Smoking Status: Current every day smoker


Does the pt drink ETOH?: No


Does the pt have substance abuse?: Yes





- Immunizations


Immunizations are current?: Yes





- POLST


Patient has POLST: No





PD ED PE NORMAL





- Vitals


Vital signs reviewed: Yes





- General


General: Alert and oriented X 3, Well developed/nourished, Other (in pain)





- Cardiac


Cardiac: RRR, Strong equal pulses, Other (L chest wall bruising midaxillary 

line. No crepitus)





- Respiratory


Respiratory: No respiratory distress, Clear bilaterally





- Derm


Derm: Normal color, Warm and dry, No rash





- Extremities


Extremities: No deformity, No tenderness to palpate, Normal ROM s pain





- Neuro


Neuro: Alert and oriented X 3, CNs 2-12 intact, No motor deficit, Normal speech





Results





- Vitals


Vitals: 


                               Vital Signs - 24 hr











  24





  00:45 02:30 02:33


 


Temperature 36.7 C 36.5 C 36.5 C


 


Heart Rate 130 H 93 


 


Respiratory 18 18 





Rate   


 


Blood Pressure 140/106 H 117/81 H 


 


O2 Saturation 99 99 








                                     Oxygen











O2 Source                      Room air

















PD Medical Decision Making





- ED course


Complexity details: reviewed results, re-evaluated patient, considered 

differential, d/w patient


ED course: 





Left-sided chest wall pain after slip and fall prior to arrival.  No crepitus.  

Patient initially tachycardic on arrival, however she is in pain.  Pain 

medications, x-rays ordered for assessment.








Chest x-ray negative for acute traumatic injuries.  Patient's pain controlled 

with medications.  Patient informed of x-ray imaging results, Norco prepack sent

for acute phase of pain.  Counseled Tylenol and ibuprofen at home as needed for 

pain as well as lidocaine patches and icing.  Counseled on the importance of 

taking deep breaths to prevent atelectasis and subsequent pneumonia.





Departure





- Departure


Disposition: 01 Home, Self Care


Clinical Impression: 


 Contusion of chest wall





Condition: Stable


Instructions:  ED Contusion Rib


Comments: 


Your x-rays today did not show any rib fracture.  I did see a bruise on your 

chest wall, which can cause pain.  Take Tylenol and ibuprofen for pain at home, 

apply lidocaine patches.  Make sure to take deep breaths to prevent development 

of pneumonia.


Forms:  PCP List


Discharge Date/Time: 24 03:04

## 2024-09-21 NOTE — XRAY REPORT
PROCEDURE:  Ribs 2V LT

 

INDICATIONS:  GLF, hit L lat lower rib cage against a wooden dec

 

TECHNIQUE:  2 views of the ribs were acquired.  

 

COMPARISON:  None.

 

FINDINGS:  

 

Bones and chest wall:  No fractures or dislocations.  No suspicious bony lesions.  Overlying soft tis
sues appear unremarkable.  

 

Lungs and pleura:  The visualized lung appears clear.  No pleural effusions or pneumothorax are visib
le.  

 

 

IMPRESSION:  

 

No displaced fracture or pneumothorax.

 

Findings are concordant with preliminary interpretation provided by [facility].

 

Reviewed by: Miller Hairston MD on 9/21/2024 7:24 AM PDT

Approved by: Miller Hairston MD on 9/21/2024 7:24 AM PDT

 

 

Station ID:  [id number]